# Patient Record
Sex: FEMALE | Race: WHITE | NOT HISPANIC OR LATINO | URBAN - METROPOLITAN AREA
[De-identification: names, ages, dates, MRNs, and addresses within clinical notes are randomized per-mention and may not be internally consistent; named-entity substitution may affect disease eponyms.]

---

## 2019-03-16 ENCOUNTER — INPATIENT (INPATIENT)
Facility: HOSPITAL | Age: 24
LOS: 3 days | Discharge: HOME | DRG: 866 | End: 2019-03-20
Attending: HOSPITALIST | Admitting: HOSPITALIST
Payer: COMMERCIAL

## 2019-03-16 VITALS
RESPIRATION RATE: 16 BRPM | DIASTOLIC BLOOD PRESSURE: 81 MMHG | HEART RATE: 130 BPM | TEMPERATURE: 101 F | OXYGEN SATURATION: 95 % | SYSTOLIC BLOOD PRESSURE: 137 MMHG

## 2019-03-16 LAB
ALBUMIN SERPL ELPH-MCNC: 4.1 G/DL — SIGNIFICANT CHANGE UP (ref 3.3–5.2)
ALP SERPL-CCNC: 311 U/L — HIGH (ref 40–120)
ALT FLD-CCNC: 325 U/L — HIGH
ANION GAP SERPL CALC-SCNC: 15 MMOL/L — SIGNIFICANT CHANGE UP (ref 5–17)
ANISOCYTOSIS BLD QL: SLIGHT — SIGNIFICANT CHANGE UP
APAP SERPL-MCNC: 8.5 UG/ML — LOW (ref 10–26)
APPEARANCE UR: CLEAR — SIGNIFICANT CHANGE UP
APTT BLD: 33.6 SEC — SIGNIFICANT CHANGE UP (ref 27.5–36.3)
AST SERPL-CCNC: 303 U/L — HIGH
BACTERIA # UR AUTO: ABNORMAL
BASOPHILS # BLD AUTO: 0.4 K/UL — HIGH (ref 0–0.2)
BASOPHILS NFR BLD AUTO: 3 % — HIGH (ref 0–2)
BILIRUB SERPL-MCNC: 1.7 MG/DL — SIGNIFICANT CHANGE UP (ref 0.4–2)
BILIRUB UR-MCNC: ABNORMAL
BUN SERPL-MCNC: 10 MG/DL — SIGNIFICANT CHANGE UP (ref 8–20)
CALCIUM SERPL-MCNC: 8.8 MG/DL — SIGNIFICANT CHANGE UP (ref 8.6–10.2)
CHLORIDE SERPL-SCNC: 94 MMOL/L — LOW (ref 98–107)
CO2 SERPL-SCNC: 22 MMOL/L — SIGNIFICANT CHANGE UP (ref 22–29)
COLOR SPEC: YELLOW — SIGNIFICANT CHANGE UP
CREAT SERPL-MCNC: 0.69 MG/DL — SIGNIFICANT CHANGE UP (ref 0.5–1.3)
DIFF PNL FLD: ABNORMAL
EOSINOPHIL # BLD AUTO: 0 K/UL — SIGNIFICANT CHANGE UP (ref 0–0.5)
EOSINOPHIL NFR BLD AUTO: 0 % — SIGNIFICANT CHANGE UP (ref 0–6)
EPI CELLS # UR: ABNORMAL
GLUCOSE SERPL-MCNC: 80 MG/DL — SIGNIFICANT CHANGE UP (ref 70–115)
GLUCOSE UR QL: NEGATIVE MG/DL — SIGNIFICANT CHANGE UP
HCG SERPL-ACNC: <4 MIU/ML — SIGNIFICANT CHANGE UP
HCG UR QL: NEGATIVE — SIGNIFICANT CHANGE UP
HCT VFR BLD CALC: 40.6 % — SIGNIFICANT CHANGE UP (ref 37–47)
HETEROPH AB TITR SER AGGL: POSITIVE
HGB BLD-MCNC: 14.1 G/DL — SIGNIFICANT CHANGE UP (ref 12–16)
HYPOCHROMIA BLD QL: SLIGHT — SIGNIFICANT CHANGE UP
INR BLD: 1.09 RATIO — SIGNIFICANT CHANGE UP (ref 0.88–1.16)
KETONES UR-MCNC: ABNORMAL
LACTATE BLDV-MCNC: 1.1 MMOL/L — SIGNIFICANT CHANGE UP (ref 0.5–2)
LEUKOCYTE ESTERASE UR-ACNC: ABNORMAL
LYMPHOCYTES # BLD AUTO: 3.4 K/UL — SIGNIFICANT CHANGE UP (ref 1–4.8)
LYMPHOCYTES # BLD AUTO: 51 % — SIGNIFICANT CHANGE UP (ref 20–55)
MCHC RBC-ENTMCNC: 31.3 PG — HIGH (ref 27–31)
MCHC RBC-ENTMCNC: 34.7 G/DL — SIGNIFICANT CHANGE UP (ref 32–36)
MCV RBC AUTO: 90 FL — SIGNIFICANT CHANGE UP (ref 81–99)
MONOCYTES # BLD AUTO: 0.8 K/UL — SIGNIFICANT CHANGE UP (ref 0–0.8)
MONOCYTES NFR BLD AUTO: 10 % — SIGNIFICANT CHANGE UP (ref 3–10)
NEUTROPHILS # BLD AUTO: 2 K/UL — SIGNIFICANT CHANGE UP (ref 1.8–8)
NEUTROPHILS NFR BLD AUTO: 32 % — LOW (ref 37–73)
NITRITE UR-MCNC: POSITIVE
PH UR: 6 — SIGNIFICANT CHANGE UP (ref 5–8)
PLAT MORPH BLD: NORMAL — SIGNIFICANT CHANGE UP
PLATELET # BLD AUTO: 112 K/UL — LOW (ref 150–400)
POIKILOCYTOSIS BLD QL AUTO: SLIGHT — SIGNIFICANT CHANGE UP
POTASSIUM SERPL-MCNC: 4 MMOL/L — SIGNIFICANT CHANGE UP (ref 3.5–5.3)
POTASSIUM SERPL-SCNC: 4 MMOL/L — SIGNIFICANT CHANGE UP (ref 3.5–5.3)
PROT SERPL-MCNC: 8 G/DL — SIGNIFICANT CHANGE UP (ref 6.6–8.7)
PROT UR-MCNC: 30 MG/DL
PROTHROM AB SERPL-ACNC: 12.6 SEC — SIGNIFICANT CHANGE UP (ref 10–12.9)
RBC # BLD: 4.51 M/UL — SIGNIFICANT CHANGE UP (ref 4.4–5.2)
RBC # FLD: 12.6 % — SIGNIFICANT CHANGE UP (ref 11–15.6)
RBC BLD AUTO: SIGNIFICANT CHANGE UP
RBC CASTS # UR COMP ASSIST: ABNORMAL /HPF (ref 0–4)
SODIUM SERPL-SCNC: 131 MMOL/L — LOW (ref 135–145)
SP GR SPEC: 1.01 — SIGNIFICANT CHANGE UP (ref 1.01–1.02)
UROBILINOGEN FLD QL: 12 MG/DL
VARIANT LYMPHS # BLD: 4 % — SIGNIFICANT CHANGE UP (ref 0–6)
WBC # BLD: 6.7 K/UL — SIGNIFICANT CHANGE UP (ref 4.8–10.8)
WBC # FLD AUTO: 6.7 K/UL — SIGNIFICANT CHANGE UP (ref 4.8–10.8)
WBC UR QL: SIGNIFICANT CHANGE UP

## 2019-03-16 PROCEDURE — 76856 US EXAM PELVIC COMPLETE: CPT | Mod: 26

## 2019-03-16 PROCEDURE — 99218: CPT

## 2019-03-16 PROCEDURE — 76700 US EXAM ABDOM COMPLETE: CPT | Mod: 26

## 2019-03-16 RX ORDER — SODIUM CHLORIDE 9 MG/ML
2000 INJECTION INTRAMUSCULAR; INTRAVENOUS; SUBCUTANEOUS ONCE
Qty: 0 | Refills: 0 | Status: COMPLETED | OUTPATIENT
Start: 2019-03-16 | End: 2019-03-16

## 2019-03-16 RX ORDER — IBUPROFEN 200 MG
600 TABLET ORAL EVERY 6 HOURS
Qty: 0 | Refills: 0 | Status: DISCONTINUED | OUTPATIENT
Start: 2019-03-16 | End: 2019-03-20

## 2019-03-16 RX ORDER — ONDANSETRON 8 MG/1
4 TABLET, FILM COATED ORAL ONCE
Qty: 0 | Refills: 0 | Status: COMPLETED | OUTPATIENT
Start: 2019-03-16 | End: 2019-03-16

## 2019-03-16 RX ORDER — CEFTRIAXONE 500 MG/1
1 INJECTION, POWDER, FOR SOLUTION INTRAMUSCULAR; INTRAVENOUS EVERY 24 HOURS
Qty: 0 | Refills: 0 | Status: DISCONTINUED | OUTPATIENT
Start: 2019-03-17 | End: 2019-03-17

## 2019-03-16 RX ORDER — ACETAMINOPHEN 500 MG
650 TABLET ORAL ONCE
Qty: 0 | Refills: 0 | Status: COMPLETED | OUTPATIENT
Start: 2019-03-16 | End: 2019-03-16

## 2019-03-16 RX ORDER — AZITHROMYCIN 500 MG/1
1000 TABLET, FILM COATED ORAL ONCE
Qty: 0 | Refills: 0 | Status: COMPLETED | OUTPATIENT
Start: 2019-03-16 | End: 2019-03-16

## 2019-03-16 RX ORDER — CEFTRIAXONE 500 MG/1
1 INJECTION, POWDER, FOR SOLUTION INTRAMUSCULAR; INTRAVENOUS ONCE
Qty: 0 | Refills: 0 | Status: COMPLETED | OUTPATIENT
Start: 2019-03-16 | End: 2019-03-16

## 2019-03-16 RX ORDER — KETOROLAC TROMETHAMINE 30 MG/ML
15 SYRINGE (ML) INJECTION ONCE
Qty: 0 | Refills: 0 | Status: DISCONTINUED | OUTPATIENT
Start: 2019-03-16 | End: 2019-03-16

## 2019-03-16 RX ORDER — SODIUM CHLORIDE 9 MG/ML
1000 INJECTION INTRAMUSCULAR; INTRAVENOUS; SUBCUTANEOUS
Qty: 0 | Refills: 0 | Status: DISCONTINUED | OUTPATIENT
Start: 2019-03-16 | End: 2019-03-19

## 2019-03-16 RX ADMIN — Medication 15 MILLIGRAM(S): at 15:37

## 2019-03-16 RX ADMIN — ONDANSETRON 4 MILLIGRAM(S): 8 TABLET, FILM COATED ORAL at 21:40

## 2019-03-16 RX ADMIN — Medication 650 MILLIGRAM(S): at 15:37

## 2019-03-16 RX ADMIN — CEFTRIAXONE 100 GRAM(S): 500 INJECTION, POWDER, FOR SOLUTION INTRAMUSCULAR; INTRAVENOUS at 14:20

## 2019-03-16 RX ADMIN — SODIUM CHLORIDE 2000 MILLILITER(S): 9 INJECTION INTRAMUSCULAR; INTRAVENOUS; SUBCUTANEOUS at 15:37

## 2019-03-16 RX ADMIN — Medication 30 MILLILITER(S): at 21:40

## 2019-03-16 RX ADMIN — SODIUM CHLORIDE 2000 MILLILITER(S): 9 INJECTION INTRAMUSCULAR; INTRAVENOUS; SUBCUTANEOUS at 14:19

## 2019-03-16 RX ADMIN — SODIUM CHLORIDE 125 MILLILITER(S): 9 INJECTION INTRAMUSCULAR; INTRAVENOUS; SUBCUTANEOUS at 18:41

## 2019-03-16 RX ADMIN — AZITHROMYCIN 1000 MILLIGRAM(S): 500 TABLET, FILM COATED ORAL at 15:36

## 2019-03-16 RX ADMIN — Medication 650 MILLIGRAM(S): at 14:19

## 2019-03-16 RX ADMIN — CEFTRIAXONE 1 GRAM(S): 500 INJECTION, POWDER, FOR SOLUTION INTRAMUSCULAR; INTRAVENOUS at 15:37

## 2019-03-16 NOTE — ED PROVIDER NOTE - OBJECTIVE STATEMENT
24 y/o female presents c/o fever x 6 days associated with left sided flank/back pain and hematuria. PT was evaluated by her PMD and had a negative flu test and was prescribed Zithromax without relief of symptoms. Denies any HA, neck pain, neck stiffness, nasal congestion, sore throat, cough, cp, sob, dysuria, urinary frequency, or vaginal discharge. PT reports the a few days prior to they symptoms started she had unprotected intercourse and she experienced pain with intercourse.  She denies any vaginal discharge. PT reports this is a new sexual partner for her.

## 2019-03-16 NOTE — ED ADULT TRIAGE NOTE - CHIEF COMPLAINT QUOTE
patient complaining of fever, hematuria over the past 6 days without improvement with left sided pain radiating to left abdomen

## 2019-03-16 NOTE — ED CDU PROVIDER INITIAL DAY NOTE - MEDICAL DECISION MAKING DETAILS
24 y/o female with fever and left flank pain admitted to observation for IV abx for likely pyelonephritis

## 2019-03-16 NOTE — ED ADULT NURSE REASSESSMENT NOTE - NS ED NURSE REASSESS COMMENT FT1
assumed care of pt @ 1930, report received from tomas MAYFIELD, charting as noted. Pt AOx4 in NAD, Vital Signs Stable. Pt denies any nausea/vomiting/abdominal pain at this time. Pt educated on plan for next dose of rocephin tm. HR is elevated 101 bpm, lung sounds are clear b/l, abd is soft and nontender with positive bowel sounds in all four quadrants, skin is warm, dry and appropriate for age and race. pt educated on plan of care and observation stay. Plan of care taught back to RN. Proficiency determined from successful pt teach back. Pt oriented to unit, staff, and room. Pt reeducated on call bell use. Bed locked in lowest position, call bell within reach. All questions and concerns addressed.

## 2019-03-16 NOTE — ED CDU PROVIDER INITIAL DAY NOTE - ATTENDING CONTRIBUTION TO CARE
I, Markos Romero, have personally performed a face to face diagnostic evaluation on this patient. I have reviewed the ACP note and agree with the history, exam and plan of care, except as noted.    24 yo F hx of fever x 6 day associated with left flank/ LUQ pain. She was seen at urgent care, had negative CXR and flu but started on Zithromax. Patient denied dysuria but Mom noted dark urine. Patient was febrile and tachycardic. WBC wnl. Lactate wnl. She was found to have UTI, consistent with pyelonephritis. She received ceftriaxone. She report pain with intercourse. Pelvic exam showed erythematous cervices but no cervical motion tenderness. 1G Azithromycin added for epimeric treatment for STD. Blood work showed elevated liver enzyme. Mono spot positive. Patient placed in obs for repeat blood work and IV antibiotic.

## 2019-03-16 NOTE — ED ADULT NURSE REASSESSMENT NOTE - NS ED NURSE REASSESS COMMENT FT1
Pt c/o nausea and 7/10 mid abdomen/epigastric discomfort. Pt states she has acid reflux and discomfort feels similar to that. Pt requesting anti-nausea/acid reflux medications. Call placed to PA, awaiting new orders.

## 2019-03-16 NOTE — ED PROVIDER NOTE - CLINICAL SUMMARY MEDICAL DECISION MAKING FREE TEXT BOX
22 y/o female with fever x one week with left sided flank pain and hematuria  possible pyelo, will require pelvic examination to evaluate for PID, will send labs, hydrate, abx, and re-eval.

## 2019-03-16 NOTE — ED CDU PROVIDER INITIAL DAY NOTE - PROGRESS NOTE DETAILS
Pt is 23y F with no PMH presenting with fever chills L flank pain x 1 week. Pt with UTI in ED. in Observation for antibiotics over night and repeat labs in the morning. Pt states she is feeling a little better. She denies any complaints at this time. PT was shown to have + mono spot. Splenomegaly on US as well as liver upper Pt is 23y F with no PMH presenting with fever chills L flank pain x 1 week. Pt with UTI in ED. in Observation for antibiotics over night and repeat labs in the morning. Pt states she is feeling a little better. She denies any complaints at this time. PT was shown to have + mono spot. Splenomegaly on US as well upper limit of normal liver. Pt resting comfortably in no acute distress. Pt told this is infectious.

## 2019-03-16 NOTE — ED ADULT NURSE REASSESSMENT NOTE - NS ED NURSE REASSESS COMMENT FT1
Assumed care of the patient at 1700. Patient moved to observation unit CDU 10. Patient A&Ox3. Mom at the bedside. No s/s of distress. Denies SOB or CP. VSS, afebrile at this time. 2nd liter of NS bolus continues infusing. Abdomen soft, nondistended. C/o mild nausea, denies any medications for nausea at this time. Denies any abdominal pain at this time. No problem with voiding. Patient updated on plan of care. Aware will be staying in observation unit. Scheduled for tomorrow to receive Rocephin IV. All questions and concerns addressed. Will continue to monitor. Assumed care of the patient at 1700. Patient moved to observation unit CDU 10. Patient A&Ox3. Mom at the bedside. No s/s of distress. Denies SOB or CP. VSS, afebrile at this time. 2nd liter of NS bolus continues infusing. Abdomen soft, nondistended. C/o mild nausea, denies any medications for nausea at this time. Denies sore throat. Denies any abdominal pain at this time. No problem with voiding. Patient updated on plan of care. Aware will be staying in observation unit. Scheduled for tomorrow to receive Rocephin IV. All questions and concerns addressed. Will continue to monitor. Assumed care of the patient at 1700. Patient moved to observation unit CDU 10. Patient A&Ox3. Mom at the bedside. No s/s of distress. Denies SOB or CP. VSS, afebrile at this time. 2nd liter of NS bolus continues infusing. Abdomen soft, nondistended. C/o mild nausea, denies any medications for nausea at this time. Denies sore throat. Denies any abdominal pain at this time. No problem with voiding however per patient urine appears "orange" with some blood and foul smell per patient. Patient updated on plan of care. Aware will be staying in observation unit. Scheduled for tomorrow to receive Rocephin IV. All questions and concerns addressed. Will continue to monitor.

## 2019-03-16 NOTE — ED PROVIDER NOTE - ATTENDING CONTRIBUTION TO CARE
I, Markos Romero, have personally performed a face to face diagnostic evaluation on this patient. I have reviewed the ACP note and agree with the history, exam and plan of care, except as noted.    22 yo F hx of fever x 6 day associated with left flank/ LUQ pain. She was seen at urgent care, had negative CXR and flu but started on Zithromax. Patient denied dysuria but Mom noted dark urine. Patient was febrile and tachycardic. WBC wnl. Lactate wnl. She was found to have UTI, consistent with pyleonephritis. She received ceftriaxone. She report pain with intercourse. Pelvic exam showed erythematus cervics but no cevical motion tenderness. 1G Azithromycine added for emperic treatment for STD. Blood work showed elevated liver enzyme. However patient denies RUQ pain. She took tylenol for the past few days with max dose of 4 pills of 500mg dose per day. No recent travel. Unknown eitology of transaminase. Hepatitis level and EBV level pending. Will place the patient in observsation unit for repeat IV abx, pending repeat blood work in the AM and abdominal ultrasound.

## 2019-03-16 NOTE — ED PROVIDER NOTE - PROGRESS NOTE DETAILS
exam ( chaperone FILIPE Murillo)  external: no lesions speculum: + scant discharge, + erythema and irritation to cervix  bi-manul: no adnexal tenderness, no CMT

## 2019-03-17 DIAGNOSIS — D64.9 ANEMIA, UNSPECIFIED: ICD-10-CM

## 2019-03-17 LAB
ALBUMIN SERPL ELPH-MCNC: 3 G/DL — LOW (ref 3.3–5.2)
ALBUMIN SERPL ELPH-MCNC: 3.2 G/DL — LOW (ref 3.3–5.2)
ALBUMIN SERPL ELPH-MCNC: 3.5 G/DL — SIGNIFICANT CHANGE UP (ref 3.3–5.2)
ALP SERPL-CCNC: 260 U/L — HIGH (ref 40–120)
ALP SERPL-CCNC: 283 U/L — HIGH (ref 40–120)
ALP SERPL-CCNC: 348 U/L — HIGH (ref 40–120)
ALT FLD-CCNC: 254 U/L — HIGH
ALT FLD-CCNC: 283 U/L — HIGH
ALT FLD-CCNC: 303 U/L — HIGH
ANION GAP SERPL CALC-SCNC: 12 MMOL/L — SIGNIFICANT CHANGE UP (ref 5–17)
ANION GAP SERPL CALC-SCNC: 13 MMOL/L — SIGNIFICANT CHANGE UP (ref 5–17)
ANION GAP SERPL CALC-SCNC: 16 MMOL/L — SIGNIFICANT CHANGE UP (ref 5–17)
ANISOCYTOSIS BLD QL: SLIGHT — SIGNIFICANT CHANGE UP
AST SERPL-CCNC: 228 U/L — HIGH
AST SERPL-CCNC: 248 U/L — HIGH
AST SERPL-CCNC: 268 U/L — HIGH
BILIRUB SERPL-MCNC: 1 MG/DL — SIGNIFICANT CHANGE UP (ref 0.4–2)
BILIRUB SERPL-MCNC: 1 MG/DL — SIGNIFICANT CHANGE UP (ref 0.4–2)
BILIRUB SERPL-MCNC: 1.3 MG/DL — SIGNIFICANT CHANGE UP (ref 0.4–2)
BUN SERPL-MCNC: 7 MG/DL — LOW (ref 8–20)
BUN SERPL-MCNC: 7 MG/DL — LOW (ref 8–20)
BUN SERPL-MCNC: 8 MG/DL — SIGNIFICANT CHANGE UP (ref 8–20)
BURR CELLS BLD QL SMEAR: PRESENT — SIGNIFICANT CHANGE UP
CALCIUM SERPL-MCNC: 7.8 MG/DL — LOW (ref 8.6–10.2)
CALCIUM SERPL-MCNC: 8.3 MG/DL — LOW (ref 8.6–10.2)
CALCIUM SERPL-MCNC: 8.3 MG/DL — LOW (ref 8.6–10.2)
CHLORIDE SERPL-SCNC: 104 MMOL/L — SIGNIFICANT CHANGE UP (ref 98–107)
CHLORIDE SERPL-SCNC: 105 MMOL/L — SIGNIFICANT CHANGE UP (ref 98–107)
CHLORIDE SERPL-SCNC: 99 MMOL/L — SIGNIFICANT CHANGE UP (ref 98–107)
CO2 SERPL-SCNC: 17 MMOL/L — LOW (ref 22–29)
CO2 SERPL-SCNC: 19 MMOL/L — LOW (ref 22–29)
CO2 SERPL-SCNC: 21 MMOL/L — LOW (ref 22–29)
CREAT SERPL-MCNC: 0.53 MG/DL — SIGNIFICANT CHANGE UP (ref 0.5–1.3)
CREAT SERPL-MCNC: 0.53 MG/DL — SIGNIFICANT CHANGE UP (ref 0.5–1.3)
CREAT SERPL-MCNC: 0.6 MG/DL — SIGNIFICANT CHANGE UP (ref 0.5–1.3)
CULTURE RESULTS: NO GROWTH — SIGNIFICANT CHANGE UP
EOSINOPHIL NFR BLD AUTO: 3 % — SIGNIFICANT CHANGE UP (ref 0–5)
GLUCOSE SERPL-MCNC: 75 MG/DL — SIGNIFICANT CHANGE UP (ref 70–115)
GLUCOSE SERPL-MCNC: 84 MG/DL — SIGNIFICANT CHANGE UP (ref 70–115)
GLUCOSE SERPL-MCNC: 99 MG/DL — SIGNIFICANT CHANGE UP (ref 70–115)
HAV IGM SER-ACNC: SIGNIFICANT CHANGE UP
HBV CORE IGM SER-ACNC: SIGNIFICANT CHANGE UP
HBV SURFACE AG SER-ACNC: SIGNIFICANT CHANGE UP
HCT VFR BLD CALC: 31.2 % — LOW (ref 37–47)
HCT VFR BLD CALC: 32.9 % — LOW (ref 37–47)
HCT VFR BLD CALC: 34.4 % — LOW (ref 37–47)
HCV AB S/CO SERPL IA: 0.13 S/CO — SIGNIFICANT CHANGE UP (ref 0–0.79)
HCV AB SERPL-IMP: SIGNIFICANT CHANGE UP
HGB BLD-MCNC: 10.5 G/DL — LOW (ref 12–16)
HGB BLD-MCNC: 11.3 G/DL — LOW (ref 12–16)
HGB BLD-MCNC: 11.7 G/DL — LOW (ref 12–16)
HYPOCHROMIA BLD QL: SLIGHT — SIGNIFICANT CHANGE UP
HYPOCHROMIA BLD QL: SLIGHT — SIGNIFICANT CHANGE UP
LDH SERPL L TO P-CCNC: 499 U/L — HIGH (ref 98–192)
LDH SERPL L TO P-CCNC: 516 U/L — HIGH (ref 98–192)
LYMPHOCYTES # BLD AUTO: 14 % — LOW (ref 20–55)
LYMPHOCYTES # BLD AUTO: 20 % — SIGNIFICANT CHANGE UP (ref 20–55)
LYMPHOCYTES # BLD AUTO: 60 % — HIGH (ref 20–55)
MACROCYTES BLD QL: SLIGHT — SIGNIFICANT CHANGE UP
MACROCYTES BLD QL: SLIGHT — SIGNIFICANT CHANGE UP
MCHC RBC-ENTMCNC: 30.4 PG — SIGNIFICANT CHANGE UP (ref 27–31)
MCHC RBC-ENTMCNC: 30.6 PG — SIGNIFICANT CHANGE UP (ref 27–31)
MCHC RBC-ENTMCNC: 31 PG — SIGNIFICANT CHANGE UP (ref 27–31)
MCHC RBC-ENTMCNC: 33.7 G/DL — SIGNIFICANT CHANGE UP (ref 32–36)
MCHC RBC-ENTMCNC: 34 G/DL — SIGNIFICANT CHANGE UP (ref 32–36)
MCHC RBC-ENTMCNC: 34.3 G/DL — SIGNIFICANT CHANGE UP (ref 32–36)
MCV RBC AUTO: 90.1 FL — SIGNIFICANT CHANGE UP (ref 81–99)
MCV RBC AUTO: 90.4 FL — SIGNIFICANT CHANGE UP (ref 81–99)
MCV RBC AUTO: 90.4 FL — SIGNIFICANT CHANGE UP (ref 81–99)
METAMYELOCYTES # FLD: 1 % — HIGH (ref 0–0)
MICROCYTES BLD QL: SLIGHT — SIGNIFICANT CHANGE UP
MONOCYTES NFR BLD AUTO: 11 % — HIGH (ref 3–10)
MONOCYTES NFR BLD AUTO: 6 % — SIGNIFICANT CHANGE UP (ref 3–10)
MONOCYTES NFR BLD AUTO: 8 % — SIGNIFICANT CHANGE UP (ref 3–10)
MYELOCYTES NFR BLD: 1 % — HIGH (ref 0–0)
MYELOCYTES NFR BLD: 1 % — HIGH (ref 0–0)
NEUTROPHILS NFR BLD AUTO: 32 % — LOW (ref 37–73)
NEUTROPHILS NFR BLD AUTO: 47 % — SIGNIFICANT CHANGE UP (ref 37–73)
NEUTROPHILS NFR BLD AUTO: 50 % — SIGNIFICANT CHANGE UP (ref 37–73)
NEUTS BAND # BLD: 1 % — SIGNIFICANT CHANGE UP (ref 0–8)
OVALOCYTES BLD QL SMEAR: SLIGHT — SIGNIFICANT CHANGE UP
PLAT MORPH BLD: NORMAL — SIGNIFICANT CHANGE UP
PLATELET # BLD AUTO: 101 K/UL — LOW (ref 150–400)
PLATELET # BLD AUTO: 104 K/UL — LOW (ref 150–400)
PLATELET # BLD AUTO: 96 K/UL — LOW (ref 150–400)
POIKILOCYTOSIS BLD QL AUTO: SLIGHT — SIGNIFICANT CHANGE UP
POTASSIUM SERPL-MCNC: 3.7 MMOL/L — SIGNIFICANT CHANGE UP (ref 3.5–5.3)
POTASSIUM SERPL-MCNC: 3.8 MMOL/L — SIGNIFICANT CHANGE UP (ref 3.5–5.3)
POTASSIUM SERPL-MCNC: 4 MMOL/L — SIGNIFICANT CHANGE UP (ref 3.5–5.3)
POTASSIUM SERPL-SCNC: 3.7 MMOL/L — SIGNIFICANT CHANGE UP (ref 3.5–5.3)
POTASSIUM SERPL-SCNC: 3.8 MMOL/L — SIGNIFICANT CHANGE UP (ref 3.5–5.3)
POTASSIUM SERPL-SCNC: 4 MMOL/L — SIGNIFICANT CHANGE UP (ref 3.5–5.3)
PROT SERPL-MCNC: 5.8 G/DL — LOW (ref 6.6–8.7)
PROT SERPL-MCNC: 6.1 G/DL — LOW (ref 6.6–8.7)
PROT SERPL-MCNC: 6.5 G/DL — LOW (ref 6.6–8.7)
RBC # BLD: 3.45 M/UL — LOW (ref 4.4–5.2)
RBC # BLD: 3.64 M/UL — LOW (ref 4.4–5.2)
RBC # BLD: 3.8 M/UL — LOW (ref 4.4–5.2)
RBC # BLD: 3.82 M/UL — LOW (ref 4.4–5.2)
RBC # FLD: 13 % — SIGNIFICANT CHANGE UP (ref 11–15.6)
RBC BLD AUTO: ABNORMAL
RETICS #: 1.2 K/UL — LOW (ref 25–125)
RETICS #: 1.4 K/UL — LOW (ref 25–125)
RETICS/RBC NFR: 0.3 % — LOW (ref 0.5–2.5)
RETICS/RBC NFR: 0.4 % — LOW (ref 0.5–2.5)
SODIUM SERPL-SCNC: 132 MMOL/L — LOW (ref 135–145)
SODIUM SERPL-SCNC: 136 MMOL/L — SIGNIFICANT CHANGE UP (ref 135–145)
SODIUM SERPL-SCNC: 138 MMOL/L — SIGNIFICANT CHANGE UP (ref 135–145)
SPECIMEN SOURCE: SIGNIFICANT CHANGE UP
VARIANT LYMPHS # BLD: 22 % — HIGH (ref 0–6)
VARIANT LYMPHS # BLD: 23 % — HIGH (ref 0–6)
WBC # BLD: 7.1 K/UL — SIGNIFICANT CHANGE UP (ref 4.8–10.8)
WBC # BLD: 7.8 K/UL — SIGNIFICANT CHANGE UP (ref 4.8–10.8)
WBC # BLD: 8 K/UL — SIGNIFICANT CHANGE UP (ref 4.8–10.8)
WBC # FLD AUTO: 7.1 K/UL — SIGNIFICANT CHANGE UP (ref 4.8–10.8)
WBC # FLD AUTO: 7.8 K/UL — SIGNIFICANT CHANGE UP (ref 4.8–10.8)
WBC # FLD AUTO: 8 K/UL — SIGNIFICANT CHANGE UP (ref 4.8–10.8)

## 2019-03-17 PROCEDURE — 99223 1ST HOSP IP/OBS HIGH 75: CPT

## 2019-03-17 PROCEDURE — 74177 CT ABD & PELVIS W/CONTRAST: CPT | Mod: 26

## 2019-03-17 PROCEDURE — 99217: CPT

## 2019-03-17 RX ORDER — EPINEPHRINE 0.3 MG/.3ML
0 INJECTION INTRAMUSCULAR; SUBCUTANEOUS
Qty: 0 | Refills: 0 | COMMUNITY

## 2019-03-17 RX ORDER — ACETAMINOPHEN 500 MG
650 TABLET ORAL EVERY 6 HOURS
Qty: 0 | Refills: 0 | Status: DISCONTINUED | OUTPATIENT
Start: 2019-03-17 | End: 2019-03-20

## 2019-03-17 RX ORDER — IRON,CARBONYL 45 MG
0 TABLET ORAL
Qty: 0 | Refills: 0 | COMMUNITY

## 2019-03-17 RX ORDER — ONDANSETRON 8 MG/1
4 TABLET, FILM COATED ORAL EVERY 6 HOURS
Qty: 0 | Refills: 0 | Status: DISCONTINUED | OUTPATIENT
Start: 2019-03-17 | End: 2019-03-20

## 2019-03-17 RX ORDER — IBUPROFEN 200 MG
600 TABLET ORAL ONCE
Qty: 0 | Refills: 0 | Status: COMPLETED | OUTPATIENT
Start: 2019-03-17 | End: 2019-03-17

## 2019-03-17 RX ORDER — CEPHALEXIN 500 MG
500 CAPSULE ORAL ONCE
Qty: 0 | Refills: 0 | Status: COMPLETED | OUTPATIENT
Start: 2019-03-17 | End: 2019-03-17

## 2019-03-17 RX ADMIN — SODIUM CHLORIDE 125 MILLILITER(S): 9 INJECTION INTRAMUSCULAR; INTRAVENOUS; SUBCUTANEOUS at 00:24

## 2019-03-17 RX ADMIN — Medication 600 MILLIGRAM(S): at 15:47

## 2019-03-17 RX ADMIN — Medication 600 MILLIGRAM(S): at 00:53

## 2019-03-17 RX ADMIN — Medication 600 MILLIGRAM(S): at 00:21

## 2019-03-17 RX ADMIN — Medication 600 MILLIGRAM(S): at 23:56

## 2019-03-17 RX ADMIN — Medication 500 MILLIGRAM(S): at 13:58

## 2019-03-17 RX ADMIN — SODIUM CHLORIDE 1000 MILLILITER(S): 9 INJECTION INTRAMUSCULAR; INTRAVENOUS; SUBCUTANEOUS at 11:14

## 2019-03-17 RX ADMIN — Medication 600 MILLIGRAM(S): at 02:00

## 2019-03-17 RX ADMIN — Medication 600 MILLIGRAM(S): at 13:58

## 2019-03-17 NOTE — CONSULT NOTE ADULT - SUBJECTIVE AND OBJECTIVE BOX
23 yr old female w no PMHx presented to [facility] ED  w fever, L flank pain and hematuria.         Pt reported the onset of fever 1 week ago, 03-10  Continued to occur over past week.  On  developed L sided flank pain.  Described as intermittent.  No radiation.  Did not grade it and does not know what makes it better or what makes it worse  +T max 103.5  + associated myalgias  + nausea  +decreased appetite  +fatigue  No travel hx or known sick contacts       In ED had blood and urine cx done.  Given IV 2 L w maintenance fluids.  Mild cervicitis seen on exam so GC/Chlamydia cx taken. Treated empirically w ceftriaxone/zithromax.   Pt given ceftriaxone +zithromax.  US done showed splenomegaly.  CT scan followed showing hepatosplenomegaly HSM.  [place] test +   LFT abn  Hb 14 dropped to 11.7 w thrombocytopenia.  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  Epistaxis      PAST SURGICAL HX  several nasal cauteries    FAMILY HX  Father used to have nosebleeds but "grew out of them"  Family member on paternal line has autoimmune disease; dx and exact relationship unknown    SOCIAL HX  nonsmoker  no alcohol  no drugs    ROS:  GEN + T max 103.5 + fatigue + decreased appetite  HEENT denied sore throat, no recent infection, no recent nasal congestion  RESP no SOB  CARD no chest pain  GI + N, + LUQ/L flank abd pain   +L flank pain, dark urine  MSK + myalgia  DERM no rash, + hx bruising easily w/o recent change    T(C): 37.1 (19 @ 15:16), Max: 39.4 (19 @ 13:43)  HR: 102 (19 @ 15:16) (85 - 113)  BP: 115/74 (19 @ 15:16) (101/58 - 119/77)  RR: 19 (19 @ 15:16) (18 - 19)  SpO2: 96% (19 @ 15:16) (96% - 100%)    PHYSICAL EXAM: evaluation precludes physical exam. Pertinent physical exam findings as per video conference with zechariahNA at bedside is as follows  appears comfortable during interview                         11.7   8.0   )-----------( 104      ( 17 Mar 2019 13:40 )   PRIOR Hb 14 03-16             34.4     17 Mar 2019 13:40    132    |  99     |  7.0    ----------------------------<  99     4.0     |  21.0   |  0.60     Ca    8.3        17 Mar 2019 13:40    TPro  6.5    /  Alb  3.5    /  TBili  1.3    /  DBili  x      /  AST  268    /  ALT  303    /  AlkPhos  348    17 Mar 2019 13:40    PT/INR - ( 16 Mar 2019 14:12 )   PT: 12.6 sec;   INR: 1.09 ratio         PTT - ( 16 Mar 2019 14:12 )  PTT:33.6 sec  CAPILLARY BLOOD GLUCOSE    LIVER FUNCTIONS - ( 17 Mar 2019 13:40 )  Alb: 3.5 g/dL / Pro: 6.5 g/dL / ALK PHOS: 348 U/L / ALT: 303 U/L / AST: 268 U/L / GGT: x             Urinalysis Basic - ( 16 Mar 2019 14:22 )    Color: Yellow / Appearance: Clear / S.015 / pH: x  Gluc: x / Ketone: Large  / Bili: Moderate / Urobili: 12 mg/dL   Blood: x / Protein: 30 mg/dL / Nitrite: Positive   Leuk Esterase: Moderate / RBC: 3-5 /HPF / WBC 3-5   Sq Epi: x / Non Sq Epi: Moderate / Bacteria: Moderate      RADIOLOGY  1) EXAM:  CT ABDOMEN AND PELVIS IC                        PROCEDURE DATE:  2019      INTERPRETATION:  Clinical Information: Left flank pain. History of   splenomegaly.    Comparison: None    Technique: Contrast enhanced  CT abdomen and pelvis with IV and oral   contrast. 100 cc of Omnipaque 350 utilized.  Axial, sagittal, coronal   reconstructions.     Findings:    Imaged chest: Unremarkable.  Hepatobiliary: Hepatomegaly, 21 cm . Gallbladder is collapsed with   moderate wall edema, nonspecific.  Spleen: Splenomegaly, 17 cm.  Pancreas: Unremarkable.  Adrenal glands: Unremarkable.  Urinary: Kidneys, ureters, urinary bladder are unremarkable.   Reproductive organs: Uterus and adnexa are unremarkable.    Gastrointestinal: The stomach, small bowel, large bowel, and appendix are   unremarkable.    Peritoneum: Unremarkable.  Extraperitoneum: Small free fluid in the pelvis.  Vasculature: Unremarkable.  Retroperitoneum: Unremarkable.  Subcutaneous tissues: Unremarkable.  Neuromusculoskeletal: Unremarkable.    Impression:   -Significant hepatosplenomegaly, unknown etiology.        2)  EXAM:  US ABDOMEN COMPLETE                        PROCEDURE DATE:  2019      INTERPRETATION:  CLINICAL INFORMATION: Elevated liver function test.    Left flank pain.    COMPARISON: None available.  TECHNIQUE: Sonography of the abdomen.     FINDINGS:  Liver: 17.8 cm, upper limits of normal in size.  Normal echogenicity.  No   focal lesion is visualized.  Bile ducts: Normal caliber. Common bile duct measures 1.5 mm.   Gallbladder: Within normal limits.      Pancreas: Visualized portions are within normal limits.  Spleen: 14.8 x 5.9 x 5.6 cm.  No focal lesion is visualized by   sonographic technique.  Right kidney: 11.7 cm No hydronephrosis.      Left kidney: 11.7 cm No hydronephrosis.      Ascites: None.  Aorta and IVC: Visualized portions are within normal limits.  IMPRESSION:     Splenomegaly.  Liver is upper limits of is normal in size. 23 yr old female w no PMHx presented to [facility] ED  w fever, L flank pain and hematuria.         Pt reported the onset of fever 1 week ago, 03-10  Continued to occur over past week.  On  developed L sided flank pain.  Described as intermittent.  No radiation.  Did not grade it and does not know what makes it better or what makes it worse  +T max 103.5  + associated myalgias  + nausea  +decreased appetite  +fatigue  +unprotected sex w new partner  No travel hx or known sick contacts       In ED had blood and urine cx done.  Given IV 2 L w maintenance fluids.  Mild cervicitis seen on exam so GC/Chlamydia cx taken. Treated empirically w ceftriaxone/zithromax.   Pt given ceftriaxone +zithromax.  US done showed splenomegaly.  CT scan followed showing hepatosplenomegaly HSM.  [place] test +   LFT abn  Hb 14 dropped to 11.7 w thrombocytopenia.  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  Epistaxis      PAST SURGICAL HX  several nasal cauteries    FAMILY HX  Father used to have nosebleeds but "grew out of them"  Family member on paternal line has autoimmune disease; dx and exact relationship unknown    SOCIAL HX  nonsmoker  no alcohol  no drugs    ROS:  GEN + T max 103.5 + fatigue + decreased appetite  HEENT denied sore throat, no recent infection, no recent nasal congestion  RESP no SOB  CARD no chest pain  GI + N, + LUQ/L flank abd pain   +L flank pain, dark urine  MSK + myalgia  DERM no rash, + hx bruising easily w/o recent change    MEDS  Epi-Pen  Multivitamin 1 a day  [place] iron gummies 1 a day  BCP name/dose unknown    ALLERGIES  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 Pertinent physical exam findings as per video conference with teleNA at bedside is as follows  appears comfortable during interview                         11.7   8.0   )-----------( 104      ( 17 Mar 2019 13:40 )   PRIOR Hb 14 03-16             34.4     17 Mar 2019 13:40    132    |  99     |  7.0    ----------------------------<  99     4.0     |  21.0   |  0.60     Ca    8.3        17 Mar 2019 13:40    TPro  6.5    /  Alb  3.5    /  TBili  1.3    /  DBili  x      /  AST  268    /  ALT  303    /  AlkPhos  348    17 Mar 2019 13:40    PT/INR - ( 16 Mar 2019 14:12 )   PT: 12.6 sec;   INR: 1.09 ratio         PTT - ( 16 Mar 2019 14:12 )  PTT:33.6 sec  CAPILLARY BLOOD GLUCOSE    LIVER FUNCTIONS - ( 17 Mar 2019 13:40 )  Alb: 3.5 g/dL / Pro: 6.5 g/dL / ALK PHOS: 348 U/L / ALT: 303 U/L / AST: 268 U/L / GGT: x             Urinalysis Basic - ( 16 Mar 2019 14:22 )    Color: Yellow / Appearance: Clear / S.015 / pH: x  Gluc: x / Ketone: Large  / Bili: Moderate / Urobili: 12 mg/dL   Blood: x / Protein: 30 mg/dL / Nitrite: Positive   Leuk Esterase: Moderate / RBC: 3-5 /HPF / WBC 3-5   Sq Epi: x / Non Sq Epi: Moderate / Bacteria: Moderate      RADIOLOGY  1) EXAM:  CT ABDOMEN AND PELVIS IC                        PROCEDURE DATE:  2019      INTERPRETATION:  Clinical Information: Left flank pain. History of   splenomegaly.    Comparison: None    Technique: Contrast enhanced  CT abdomen and pelvis with IV and oral   contrast. 100 cc of Omnipaque 350 utilized.  Axial, sagittal, coronal   reconstructions.     Findings:    Imaged chest: Unremarkable.  Hepatobiliary: Hepatomegaly, 21 cm . Gallbladder is collapsed with   moderate wall edema, nonspecific.  Spleen: Splenomegaly, 17 cm.  Pancreas: Unremarkable.  Adrenal glands: Unremarkable.  Urinary: Kidneys, ureters, urinary bladder are unremarkable.   Reproductive organs: Uterus and adnexa are unremarkable.    Gastrointestinal: The stomach, small bowel, large bowel, and appendix are   unremarkable.    Peritoneum: Unremarkable.  Extraperitoneum: Small free fluid in the pelvis.  Vasculature: Unremarkable.  Retroperitoneum: Unremarkable.  Subcutaneous tissues: Unremarkable.  Neuromusculoskeletal: Unremarkable.    Impression:   -Significant hepatosplenomegaly, unknown etiology.        2)  EXAM:  US ABDOMEN COMPLETE                        PROCEDURE DATE:  2019      INTERPRETATION:  CLINICAL INFORMATION: Elevated liver function test.    Left flank pain.    COMPARISON: None available.  TECHNIQUE: Sonography of the abdomen.     FINDINGS:  Liver: 17.8 cm, upper limits of normal in size.  Normal echogenicity.  No   focal lesion is visualized.  Bile ducts: Normal caliber. Common bile duct measures 1.5 mm.   Gallbladder: Within normal limits.      Pancreas: Visualized portions are within normal limits.  Spleen: 14.8 x 5.9 x 5.6 cm.  No focal lesion is visualized by   sonographic technique.  Right kidney: 11.7 cm No hydronephrosis.      Left kidney: 11.7 cm No hydronephrosis.      Ascites: None.  Aorta and IVC: Visualized portions are within normal limits.  IMPRESSION:     Splenomegaly.  Liver is upper limits of is normal in size.

## 2019-03-17 NOTE — H&P ADULT - HISTORY OF PRESENT ILLNESS
24 y/o F with Hx Personal and family Hx of Epistaxis, as per her she has been having intermittent fever for last week with L flank pain, initially she started to have fever then after few days flank pain developed her pain is dull and intermittent, yesterday her fever got to 103.5F, hr fever is associated with myalgias, some nausea and fatigue, as per her she had unprotected sex w new partner, after sex she has some vaginal bleeding, she denies any discharge or any more bleeding now, her vaginal pain lasted for few days and then resolved, she has No travel hx or known sick contacts, she denies dysuria, cough, diarrhea, sore throat or swollen glands or joints, dysphagia or odynophagia, she has noted some blood in urine; otherwise, denies bleeding.    In ED had blood and urine cx done, she was  given IV 2 L w maintenance fluids,  Mild cervicitis seen on exam so GC/Chlamydia cx taken, Treated empirically w ceftriaxone/zithromax,  US done showed splenomegaly.  CT scan followed showing hepatosplenomegaly HSM.  [place] test +   LFT abn, Hb 14 dropped to 11.7 w thrombocytopenia.  Had acetaminophen level neg, pos for Mono using agg test  Blood work shows elevated LDH; retic is not elevated; LFT's are elevated;  radiologic studies show HSM; Peripheral smear shows atypical lymphs, myelocytes, being admitted under medicine for further evaluation of Anemia, thrombocytopenia, organomegaly, and fever.

## 2019-03-17 NOTE — ED CDU PROVIDER SUBSEQUENT DAY NOTE - HISTORY
22 y/o female presents c/o fever x 6 days associated with left sided flank/back pain and hematuria. PT was evaluated by her PMD and had a negative flu test and was prescribed Zithromax without relief of symptoms. Denies any HA, neck pain, neck stiffness, nasal congestion, sore throat, cough, cp, sob, dysuria, urinary frequency, or vaginal discharge. PT reports the a few days prior to they symptoms started she had unprotected intercourse and she experienced pain with intercourse.  She denies any vaginal discharge. PT reports this is a new sexual partner for her.

## 2019-03-17 NOTE — ED ADULT NURSE REASSESSMENT NOTE - COMFORT CARE
meal provided/ambulated to bathroom/plan of care explained
plan of care explained/side rails down/meal provided

## 2019-03-17 NOTE — ED ADULT NURSE REASSESSMENT NOTE - NS ED NURSE REASSESS COMMENT FT1
Pt reports relief from pain at this time. temp 100.7, motrin administered. Pt resting in stretcher in NAD.

## 2019-03-17 NOTE — CONSULT NOTE ADULT - SUBJECTIVE AND OBJECTIVE BOX
HPI: Patient is a 23y Female seen on consultation for the evaluation and management of anemia and thrombocytopenia.  She has no known PMHx and was in her USOH until last week when she developed fevers to 102 degrees, associated with chills, nausea, one episode of vomiting.  Denied localizing symptoms; denies dysuria, cough, diarrhea.  Developed LUQ pain 2 days prior.  Still notes low grade fevers, poor po intake over this week.  Denies sore throat or swollen glands, denies dysphagia or odynophagia.  has noted blood in urine; otherwise, denies bleeding.  has occasional epistaxis.  Tests pos for Mono using agg test.  Blood work shows elevated LDH; retic is not elevated; LFT's are elevated;  radiologic studies show HSM; Peripheral smear shows atypical lymphs, myelocytes, metamyelocytes.  Had headaches with fever; denies dizziness; denies bone or joint pain.      PAST MEDICAL & SURGICAL HISTORY:  No pertinent past medical history  No significant past surgical history      REVIEW OF SYSTEMS      General:fevers	    Skin/Breast:no rash or lesion  	  Ophthalmologic:no blurry vision or diplopia  	  ENMT:	no erythema, pus or swelling    Respiratory and Thorax:clear,no SOB or wheezing  	  Cardiovascular:	no chest pain or palpitations    Gastrointestinal:	notes LUQ pain  Genitourinary:	notes red colored urine    Musculoskeletal:	no bone or joint pain    Neurological:	no seizure or tremor      Hematology/Lymphatics:	no c/o LN        MEDICATIONS  (STANDING):  sodium chloride 0.9%. 1000 milliLiter(s) (125 mL/Hr) IV Continuous <Continuous>    MEDICATIONS  (PRN):  acetaminophen   Tablet .. 650 milliGRAM(s) Oral every 6 hours PRN Temp greater or equal to 38C (100.4F), Mild Pain (1 - 3)  aluminum hydroxide/magnesium hydroxide/simethicone Suspension 30 milliLiter(s) Oral every 4 hours PRN Dyspepsia  ibuprofen  Tablet. 600 milliGRAM(s) Oral every 6 hours PRN Temp greater or equal to 38C (100.4F), Moderate Pain (4 - 6)  ondansetron Injectable 4 milliGRAM(s) IV Push every 6 hours PRN Nausea      Allergies    No Known Drug Allergies  Nuts (Anaphylaxis)    Intolerances        SOCIAL HISTORY:    Smoking Status:denied  Alcohol:denied    Occupation:Nursing student    FAMILY HISTORY:no FHx of maligancy in first degree relatives            	            Vital Signs Last 24 Hrs  T(C): 37.1 (17 Mar 2019 15:16), Max: 39.4 (17 Mar 2019 13:43)  T(F): 98.8 (17 Mar 2019 15:16), Max: 102.9 (17 Mar 2019 13:43)  HR: 102 (17 Mar 2019 15:16) (85 - 113)  BP: 115/74 (17 Mar 2019 15:16) (101/58 - 119/77)  BP(mean): --  RR: 19 (17 Mar 2019 15:16) (18 - 19)  SpO2: 96% (17 Mar 2019 15:16) (96% - 100%)    PHYSICAL EXAM:      Constitutional:WDWN healthy appearing, awake, alert, NAD    Eyes:anicteric    ENMT:no lesion, no erythema, no evidence of infection    Neck:no adenopathy      Respiratory:Clear, without wheezing or rhonchi    Cardiovascular:RRR normal S1S2    Gastrointestinal:soft, palpable spleen tip          Extremities:no c/C/Edema            Skin:no rash    Lymph Nodes:no adenoptahy                LABS:                        11.7   8.0   )-----------( 104      ( 17 Mar 2019 13:40 )             34.4         132<L>  |  99  |  7.0<L>  ----------------------------<  99  4.0   |  21.0<L>  |  0.60    Ca    8.3<L>      17 Mar 2019 13:40    TPro  6.5<L>  /  Alb  3.5  /  TBili  1.3  /  DBili  x   /  AST  268<H>  /  ALT  303<H>  /  AlkPhos  348<H>  17    PT/INR - ( 16 Mar 2019 14:12 )   PT: 12.6 sec;   INR: 1.09 ratio         PTT - ( 16 Mar 2019 14:12 )  PTT:33.6 sec  Urinalysis Basic - ( 16 Mar 2019 14:22 )    Color: Yellow / Appearance: Clear / S.015 / pH: x  Gluc: x / Ketone: Large  / Bili: Moderate / Urobili: 12 mg/dL   Blood: x / Protein: 30 mg/dL / Nitrite: Positive   Leuk Esterase: Moderate / RBC: 3-5 /HPF / WBC 3-5   Sq Epi: x / Non Sq Epi: Moderate / Bacteria: Moderate        RADIOLOGY & ADDITIONAL STUDIES:

## 2019-03-17 NOTE — H&P ADULT - NSHPREVIEWOFSYSTEMS_GEN_ALL_CORE
CONSTITUTIONAL: has fever, some fatigue  RESPIRATORY: No cough, No shortness of breath  CARDIOVASCULAR: No chest pain, palpitations  GASTROINTESTINAL: No abdominal, some nausea, no vomiting, flank pain  NEUROLOGICAL: No headaches,  loss of strength.  MISCELLANEOUS: No joint swelling or pain

## 2019-03-17 NOTE — ED CDU PROVIDER SUBSEQUENT DAY NOTE - PROGRESS NOTE DETAILS
PT reports improvement of her symptoms at time of morning rounds. She was able to tolerate breakfast this morning. Repeat labs show a drop in H & H. PT denies any abdominal pain. Abdomen is soft non tender with no rebound or guarding however will obtain CT abdomen to evaluate for splenic injury due to enlargement of spleen on US. LDH elevated however retic count wnl. IV fluids discontinued and will repeat cbc to evaluate for possible dilutional anemia secondary to fluid administration. Discussed with Dr. Kay

## 2019-03-17 NOTE — H&P ADULT - NSHPLABSRESULTS_GEN_ALL_CORE
11.7   8.0   )-----------( 104      ( 17 Mar 2019 13:40 )             34.4         132<L>  |  99  |  7.0<L>  ----------------------------<  99  4.0   |  21.0<L>  |  0.60    Ca    8.3<L>      17 Mar 2019 13:40    TPro  6.5<L>  /  Alb  3.5  /  TBili  1.3  /  DBili  x   /  AST  268<H>  /  ALT  303<H>  /  AlkPhos  348<H>      PT/INR - ( 16 Mar 2019 14:12 )   PT: 12.6 sec;   INR: 1.09 ratio         PTT - ( 16 Mar 2019 14:12 )  PTT:33.6 sec  Urinalysis Basic - ( 16 Mar 2019 14:22 )    Color: Yellow / Appearance: Clear / S.015 / pH: x  Gluc: x / Ketone: Large  / Bili: Moderate / Urobili: 12 mg/dL   Blood: x / Protein: 30 mg/dL / Nitrite: Positive   Leuk Esterase: Moderate / RBC: 3-5 /HPF / WBC 3-5   Sq Epi: x / Non Sq Epi: Moderate / Bacteria: Moderate    < from: US Abdomen Complete (19 @ 18:13) >    Liver is upper limits of is normal in size.    < end of copied text >    < from: CT Abdomen and Pelvis w/ IV Cont (19 @ 12:41) >    -Significant hepatosplenomegaly, unknown etiology.    < end of copied text >

## 2019-03-17 NOTE — CONSULT NOTE ADULT - ASSESSMENT
Imp:  23 year old female presents with febrile illness; tests pos for Mono.  CBC shows atypical lymph's, in keeping with mono.  Has HSM, also c/w mono.  Inital Hb may have been artificially high as pt presented with dehydration; no evidence of bleeding.  Plt ct may be falling because of splenomegaly.  Unlikely hemolysis as Retic not elevated  Rec:  Observe closely  Will review PS; if CBC does not begin to correct, may need additional studies such as flow cytometry, looking for potential BM pathology.  See orders  Will follow   Thank you

## 2019-03-17 NOTE — H&P ADULT - NSHPPHYSICALEXAM_GEN_ALL_CORE
Vital Signs Last 24 Hrs  T(C): 37.1 (17 Mar 2019 15:16), Max: 39.4 (17 Mar 2019 13:43)  T(F): 98.8 (17 Mar 2019 15:16), Max: 102.9 (17 Mar 2019 13:43)  HR: 102 (17 Mar 2019 15:16) (85 - 113)  BP: 115/74 (17 Mar 2019 15:16) (101/58 - 119/77)  RR: 19 (17 Mar 2019 15:16) (18 - 19)  SpO2: 96% (17 Mar 2019 15:16) (96% - 100%)    PHYSICAL EXAM:    GENERAL: Young female looking comfortable  HEENT: PERRL, +EOMI  NECK: soft, Supple, No JVD  CHEST/LUNG: Clear to auscultate bilaterally; No wheezing  HEART: S1S2+, Regular rate and rhythm; No murmurs  ABDOMEN: Soft, Nontender, Nondistended; Bowel sounds present, organomegaly, nontender on exam, no CVA tenderness  EXTREMITIES:  2+ Peripheral Pulses, No edema  SKIN: No rashes or lesions  NEURO: AAOX3, no focal deficits, no motor r sensory loss  PSYCH: normal mood

## 2019-03-17 NOTE — ED ADULT NURSE REASSESSMENT NOTE - NS ED NURSE REASSESS COMMENT FT1
pt resting in stretcher in NAD, Vital Signs Stable. CBC and CMP drawn and sent to lab this AM. Pt educated on next dose of Rocephin abx @ 1400. All questions and concerns addressed, comfort measures provided.

## 2019-03-17 NOTE — ED CDU PROVIDER SUBSEQUENT DAY NOTE - ATTENDING CONTRIBUTION TO CARE
seen with acp  positive fever  malaise  ct scan shows positive spleno megaly  positive mono test will place in  obs  Agree with acps disposition assessment hx and physical

## 2019-03-17 NOTE — CONSULT NOTE ADULT - ATTENDING COMMENTS
contraindication for VTE due to low plts  med rec  requested heme via ED signout contraindication for VTE due to low plts  med rec  requested heme via ED signout      Follow up name of BCP to see if we continue in hospital

## 2019-03-17 NOTE — H&P ADULT - ASSESSMENT
22 y/o F with Hx Personal and family Hx of Epistaxis, as per her she has been having intermittent fever for last week with L flank pain, initially she started to have fever then after few days flank pain developed her pain is dull and intermittent, yesterday her fever got to 103.5F, hr fever is associated with myalgias, some nausea and fatigue, as per her she had unprotected sex w new partner, after sex she has some vaginal bleeding, she denies any discharge or any more bleeding now, her vaginal pain lasted for few days and then resolved, she has No travel hx or known sick contacts, she denies dysuria, cough, diarrhea, sore throat or swollen glands or joints, dysphagia or odynophagia, she has noted some blood in urine; otherwise, denies bleeding.    In ED had blood and urine cx done, she was  given IV 2 L w maintenance fluids,  Mild cervicitis seen on exam so GC/Chlamydia cx taken, Treated empirically w ceftriaxone/zithromax,  US done showed splenomegaly.  CT scan followed showing hepatosplenomegaly HSM.  [place] test +   LFT abn, Hb 14 dropped to 11.7 w thrombocytopenia.  Had acetaminophen level neg, pos for Mono using agg test  Blood work shows elevated LDH; retic is not elevated; LFT's are elevated;  radiologic studies show HSM; Peripheral smear shows atypical lymphs, myelocytes, being admitted under medicine for further evaluation of Anemia, thrombocytopenia, organomegaly, and fever, give UA with WBCs 0-3, no symptoms less likely UTI.     Plan:     Fever: Likely from Mononucleosis, will give Motrin PRN and low dose tylenole as liver enzymes are elevated, will follow cultures urine and blood, will send viral serology and PCR, will continue with IV fluids.     Anemia: as per patient she has Hx of anemia, her initial Hb might not be hemoconcentration given she came in was dehydrated, she got fluids and repeat Hb is around 11, not knowing baseline,   elevated LDH and nl bili, will send workup for hemolysis, less likely, hematology consult appreciated, will monitor CBC, autoimmune workup ordered, will follow.     Hepatosplenomegaly: Likely from the Infectious mononucleosis, will monitor LFTs as she is noted to have elevated liver enzymes as well, hep panel negative, will get Hepc PCR and Hep B surface antibody, will also get CMV, HSV and EBV PCR.      Mild thrombocytopenia likely due to hypersplenism, will monitor CBC, she has prior hx epistaxis necessitating cautery in past but w/o menorrhagia/metrorrhagia.     DVT prophylaxis: SCDs.

## 2019-03-17 NOTE — ED CDU PROVIDER DISPOSITION NOTE - CLINICAL COURSE
22 y/o female admitted to observation for fever, left sided flank pain and hematuria found to have elevated liver enzymes with + mono spot test. PT was observed overnight and tx with abx for possible pyelonephritis. On morning labs she was found to have a significant drop in H & H.  Fluids were withheld and repeat lab testing showed persistent low H & H.  Hematology consult placed and will admit to medicine to evaluate for possible hemolytic anemia.

## 2019-03-17 NOTE — ED ADULT NURSE REASSESSMENT NOTE - NS ED NURSE REASSESS COMMENT FT1
Assumed pt care at 730A. Patient AOX3, VSS, no sign of distress noted, c/o LUQ abd pain 5/10, patient refused ibuprofen at this time, states that she is comfortable with her pain level. IVF infusing all needs met , nursing care continues.

## 2019-03-17 NOTE — ED ADULT NURSE REASSESSMENT NOTE - NS ED NURSE REASSESS COMMENT FT1
Pt alert and oriented, no apparent distress noted at this time. Pt handed off to sorin MAYFIELD in stable condition.

## 2019-03-17 NOTE — ED CDU PROVIDER DISPOSITION NOTE - ATTENDING CONTRIBUTION TO CARE
seen with acp  hx of flank pain fever ct scan shows positive splenomegaly  patient has a positive mono test  while in obs. patient had an unexplained drop of hgb from 14 to 10  rectal is guiac negative  retic ount is normal  patient is pending a hematology consult will admit to the hospital  Agree with acps disposition assessment hx and physical

## 2019-03-17 NOTE — ED ADULT NURSE REASSESSMENT NOTE - NS ED NURSE REASSESS COMMENT FT1
pt resting in NAD in stretcher. Pt family at bedside. Pt verbalizes no questions, concerns, needs at this time. RN rounding frequently to assess needs.

## 2019-03-18 LAB
ALBUMIN SERPL ELPH-MCNC: 3.2 G/DL — LOW (ref 3.3–5.2)
ALP SERPL-CCNC: 324 U/L — HIGH (ref 40–120)
ALT FLD-CCNC: 263 U/L — HIGH
ANION GAP SERPL CALC-SCNC: 11 MMOL/L — SIGNIFICANT CHANGE UP (ref 5–17)
APTT BLD: 30.1 SEC — SIGNIFICANT CHANGE UP (ref 27.5–36.3)
AST SERPL-CCNC: 210 U/L — HIGH
BASOPHILS # BLD AUTO: 0.3 K/UL — HIGH (ref 0–0.2)
BASOPHILS NFR BLD AUTO: 1 % — SIGNIFICANT CHANGE UP (ref 0–2)
BILIRUB DIRECT SERPL-MCNC: 0.5 MG/DL — HIGH (ref 0–0.3)
BILIRUB INDIRECT FLD-MCNC: 0.5 MG/DL — SIGNIFICANT CHANGE UP (ref 0.2–1)
BILIRUB SERPL-MCNC: 1 MG/DL — SIGNIFICANT CHANGE UP (ref 0.4–2)
BUN SERPL-MCNC: 5 MG/DL — LOW (ref 8–20)
C TRACH RRNA SPEC QL NAA+PROBE: SIGNIFICANT CHANGE UP
CALCIUM SERPL-MCNC: 8.4 MG/DL — LOW (ref 8.6–10.2)
CHLORIDE SERPL-SCNC: 102 MMOL/L — SIGNIFICANT CHANGE UP (ref 98–107)
CO2 SERPL-SCNC: 25 MMOL/L — SIGNIFICANT CHANGE UP (ref 22–29)
CREAT SERPL-MCNC: 0.5 MG/DL — SIGNIFICANT CHANGE UP (ref 0.5–1.3)
EBV EA AB SER IA-ACNC: 17.6 U/ML — HIGH
EBV EA AB TITR SER IF: NEGATIVE — SIGNIFICANT CHANGE UP
EBV EA IGG SER-ACNC: POSITIVE
EBV NA IGG SER IA-ACNC: <3 U/ML — SIGNIFICANT CHANGE UP
EBV PATRN SPEC IB-IMP: SIGNIFICANT CHANGE UP
EBV VCA IGG AVIDITY SER QL IA: POSITIVE
EBV VCA IGM SER IA-ACNC: 25.1 U/ML — HIGH
EBV VCA IGM SER IA-ACNC: >160 U/ML — HIGH
EBV VCA IGM TITR FLD: POSITIVE
EOSINOPHIL # BLD AUTO: 0 K/UL — SIGNIFICANT CHANGE UP (ref 0–0.5)
FERRITIN SERPL-MCNC: 1522 NG/ML — HIGH (ref 15–150)
FOLATE SERPL-MCNC: 16.6 NG/ML — SIGNIFICANT CHANGE UP
GLUCOSE SERPL-MCNC: 118 MG/DL — HIGH (ref 70–115)
HAPTOGLOB SERPL-MCNC: <20 MG/DL — LOW (ref 34–200)
HCT VFR BLD CALC: 33.5 % — LOW (ref 37–47)
HGB BLD-MCNC: 11.3 G/DL — LOW (ref 12–16)
IGA FLD-MCNC: 172 MG/DL — SIGNIFICANT CHANGE UP (ref 84–499)
IGG FLD-MCNC: 1017 MG/DL — SIGNIFICANT CHANGE UP (ref 610–1660)
IGM SERPL-MCNC: 155 MG/DL — SIGNIFICANT CHANGE UP (ref 35–242)
INR BLD: 0.98 RATIO — SIGNIFICANT CHANGE UP (ref 0.88–1.16)
IRON SATN MFR SERPL: 23 % — SIGNIFICANT CHANGE UP (ref 14–50)
IRON SATN MFR SERPL: 73 UG/DL — SIGNIFICANT CHANGE UP (ref 37–145)
LACTATE SERPL-SCNC: 1.3 MMOL/L — SIGNIFICANT CHANGE UP (ref 0.5–2)
LDH SERPL L TO P-CCNC: 536 U/L — HIGH (ref 98–192)
LYMPHOCYTES # BLD AUTO: 42 % — SIGNIFICANT CHANGE UP (ref 20–55)
LYMPHOCYTES # BLD AUTO: 5.1 K/UL — HIGH (ref 1–4.8)
MCHC RBC-ENTMCNC: 30.3 PG — SIGNIFICANT CHANGE UP (ref 27–31)
MCHC RBC-ENTMCNC: 33.7 G/DL — SIGNIFICANT CHANGE UP (ref 32–36)
MCV RBC AUTO: 89.8 FL — SIGNIFICANT CHANGE UP (ref 81–99)
MONOCYTES # BLD AUTO: 0.8 K/UL — SIGNIFICANT CHANGE UP (ref 0–0.8)
MONOCYTES NFR BLD AUTO: 11 % — HIGH (ref 3–10)
N GONORRHOEA RRNA SPEC QL NAA+PROBE: SIGNIFICANT CHANGE UP
NEUTROPHILS # BLD AUTO: 1.6 K/UL — LOW (ref 1.8–8)
NEUTROPHILS NFR BLD AUTO: 34 % — LOW (ref 37–73)
NEUTS BAND # BLD: 6 % — SIGNIFICANT CHANGE UP (ref 0–8)
PLAT MORPH BLD: NORMAL — SIGNIFICANT CHANGE UP
PLATELET # BLD AUTO: 117 K/UL — LOW (ref 150–400)
POTASSIUM SERPL-MCNC: 3.9 MMOL/L — SIGNIFICANT CHANGE UP (ref 3.5–5.3)
POTASSIUM SERPL-SCNC: 3.9 MMOL/L — SIGNIFICANT CHANGE UP (ref 3.5–5.3)
PROCALCITONIN SERPL-MCNC: 0.25 NG/ML — HIGH (ref 0–0.04)
PROT SERPL-MCNC: 6.4 G/DL — LOW (ref 6.6–8.7)
PROTHROM AB SERPL-ACNC: 11.3 SEC — SIGNIFICANT CHANGE UP (ref 10–12.9)
RBC # BLD: 3.73 M/UL — LOW (ref 4.4–5.2)
RBC # BLD: 3.73 M/UL — LOW (ref 4.4–5.2)
RBC # FLD: 13 % — SIGNIFICANT CHANGE UP (ref 11–15.6)
RBC BLD AUTO: SIGNIFICANT CHANGE UP
RETICS #: 1.6 K/UL — LOW (ref 25–125)
RETICS/RBC NFR: 0.4 % — LOW (ref 0.5–2.5)
SODIUM SERPL-SCNC: 138 MMOL/L — SIGNIFICANT CHANGE UP (ref 135–145)
SPECIMEN SOURCE: SIGNIFICANT CHANGE UP
TIBC SERPL-MCNC: 313 UG/DL — SIGNIFICANT CHANGE UP (ref 220–430)
TRANSFERRIN SERPL-MCNC: 219 MG/DL — SIGNIFICANT CHANGE UP (ref 192–382)
VARIANT LYMPHS # BLD: 6 % — SIGNIFICANT CHANGE UP (ref 0–6)
VIT B12 SERPL-MCNC: 1156 PG/ML — SIGNIFICANT CHANGE UP (ref 232–1245)
WBC # BLD: 7.8 K/UL — SIGNIFICANT CHANGE UP (ref 4.8–10.8)
WBC # FLD AUTO: 7.8 K/UL — SIGNIFICANT CHANGE UP (ref 4.8–10.8)

## 2019-03-18 PROCEDURE — 99232 SBSQ HOSP IP/OBS MODERATE 35: CPT

## 2019-03-18 RX ORDER — SIMETHICONE 80 MG/1
80 TABLET, CHEWABLE ORAL ONCE
Qty: 0 | Refills: 0 | Status: DISCONTINUED | OUTPATIENT
Start: 2019-03-18 | End: 2019-03-20

## 2019-03-18 RX ADMIN — Medication 600 MILLIGRAM(S): at 20:00

## 2019-03-18 RX ADMIN — SODIUM CHLORIDE 125 MILLILITER(S): 9 INJECTION INTRAMUSCULAR; INTRAVENOUS; SUBCUTANEOUS at 10:10

## 2019-03-18 RX ADMIN — Medication 600 MILLIGRAM(S): at 18:36

## 2019-03-18 RX ADMIN — SODIUM CHLORIDE 125 MILLILITER(S): 9 INJECTION INTRAMUSCULAR; INTRAVENOUS; SUBCUTANEOUS at 18:24

## 2019-03-18 NOTE — CHART NOTE - NSCHARTNOTEFT_GEN_A_CORE
S:  Nurse c/o pt c distended abd, pt c/o gas & gerd, has hx of same, pt recently restarted p.o. diet; pt admits to continuing mild upper abd pain, no change since yesterday;  pt endorses good appetite and nl bowel movements and denies nausea.    O:  pt a&o x 3, vss,  no acute distress, pleasant reliable historian, speaking full sentences, head of bed is raised at 30 degrees prior to exam.  abd soft, nondistended, slight ruq & luq tenderness on palpation, spleen & liver palpaple, normoactive bowel sounds, no guarding, no rebound T    A:  24yo female adm c likely acute ebv syndrome, hepatosplenomegaly on exam, no distention, pmh of gerd, now c dyspepsia    P:  Raise head of bed to 60 degrees while awake       Maalox prn       cont present mgt       Hem/Onc following       Close observation

## 2019-03-18 NOTE — PROGRESS NOTE PEDS - ASSESSMENT
Imp:  23 year old female presents with febrile illness; tests pos for Mono.  CBC shows atypical lymph's, in keeping with mono.  Has HSM, also c/w mono.  Inital Hb may have been artificially high as pt presented with dehydration; no evidence of bleeding.  Plt ct may be falling because of splenomegaly.  Unlikely hemolysis as Retic not elevated.  HB and plt ct improved today; LFT's also improved  Still febrile  Rec:  Observe closely  Will review PS; if CBC does not begin to correct, may need additional studies such as flow cytometry, looking for potential BM pathology.  See orders  Discussed with patient-to see on follow up post discharge to ensure abnormalities resolve  Will follow   Thank you

## 2019-03-18 NOTE — PROGRESS NOTE PEDS - SUBJECTIVE AND OBJECTIVE BOX
BHUPINDER PABLO    [id number]    23y      Female    Patient is a 23y old  Female who presents with a chief complaint of Fever and flank pain (17 Mar 2019 18:52)      INTERVAL HPI/OVERNIGHT EVENTS:    REVIEW OF SYSTEMS:    CONSTITUTIONAL: has fever, some fatigue  RESPIRATORY: No cough, No shortness of breath  CARDIOVASCULAR: No chest pain, palpitations  GASTROINTESTINAL: No abdominal, some nausea, no vomiting, flank pain  NEUROLOGICAL: No headaches,  loss of strength.  MISCELLANEOUS: No joint swelling or pain       Vital Signs Last 24 Hrs  T(C): 39.2 (18 Mar 2019 18:25), Max: 39.2 (18 Mar 2019 18:25)  T(F): 102.5 (18 Mar 2019 18:25), Max: 102.5 (18 Mar 2019 18:25)  HR: 103 (18 Mar 2019 16:14) (90 - 116)  BP: 125/79 (18 Mar 2019 08:06) (100/62 - 125/79)  BP(mean): --  RR: 16 (18 Mar 2019 16:14) (16 - 20)  SpO2: 100% (18 Mar 2019 16:14) (99% - 100%)    PHYSICAL EXAM:    GENERAL: Young female looking comfortable  HEENT: PERRL, +EOMI  NECK: soft, Supple, No JVD  CHEST/LUNG: Clear to auscultate bilaterally; No wheezing  HEART: S1S2+, Regular rate and rhythm; No murmurs  ABDOMEN: Soft, Nontender, Nondistended; Bowel sounds present, organomegaly, nontender on exam, no CVA tenderness  EXTREMITIES:  2+ Peripheral Pulses, No edema  SKIN: No rashes or lesions  NEURO: AAOX3, no focal deficits, no motor r sensory loss  PSYCH: normal mood      LABS:                        11.3   7.8   )-----------( 117      ( 18 Mar 2019 09:39 )             33.5     03-18    138  |  102  |  5.0<L>  ----------------------------<  118<H>  3.9   |  25.0  |  0.50    Ca    8.4<L>      18 Mar 2019 09:39    TPro  6.4<L>  /  Alb  3.2<L>  /  TBili  1.0  /  DBili  0.5<H>  /  AST  210<H>  /  ALT  263<H>  /  AlkPhos  324<H>  03-18    PT/INR - ( 18 Mar 2019 09:39 )   PT: 11.3 sec;   INR: 0.98 ratio         PTT - ( 18 Mar 2019 09:39 )  PTT:30.1 sec        I&O's Summary    18 Mar 2019 07:01  -  18 Mar 2019 19:46  --------------------------------------------------------  IN: 1125 mL / OUT: 0 mL / NET: 1125 mL        MEDICATIONS  (STANDING):  sodium chloride 0.9%. 1000 milliLiter(s) (125 mL/Hr) IV Continuous <Continuous>    MEDICATIONS  (PRN):  acetaminophen   Tablet .. 650 milliGRAM(s) Oral every 6 hours PRN Temp greater or equal to 38C (100.4F), Mild Pain (1 - 3)  aluminum hydroxide/magnesium hydroxide/simethicone Suspension 30 milliLiter(s) Oral every 4 hours PRN Dyspepsia  ibuprofen  Tablet. 600 milliGRAM(s) Oral every 6 hours PRN Temp greater or equal to 38C (100.4F), Moderate Pain (4 - 6)  ondansetron Injectable 4 milliGRAM(s) IV Push every 6 hours PRN Nausea BHUPINDER PABLO    [id number]    23y      Female    Patient is a 23y old  Female who presents with a chief complaint of Fever and flank pain (17 Mar 2019 18:52)      INTERVAL HPI/OVERNIGHT EVENTS:    Patient is feeling ok, has some fullness in the abdomen, pain is better, tmax last night was 101f    REVIEW OF SYSTEMS:    CONSTITUTIONAL: has fever, some fatigue  RESPIRATORY: No cough, No shortness of breath  CARDIOVASCULAR: No chest pain, palpitations  GASTROINTESTINAL: No abdominal, some nausea, no vomiting  NEUROLOGICAL: No headaches,  loss of strength.  MISCELLANEOUS: No joint swelling or pain       Vital Signs Last 24 Hrs  T(C): 39.2 (18 Mar 2019 18:25), Max: 39.2 (18 Mar 2019 18:25)  T(F): 102.5 (18 Mar 2019 18:25), Max: 102.5 (18 Mar 2019 18:25)  HR: 103 (18 Mar 2019 16:14) (90 - 116)  BP: 125/79 (18 Mar 2019 08:06) (100/62 - 125/79)  BP(mean): --  RR: 16 (18 Mar 2019 16:14) (16 - 20)  SpO2: 100% (18 Mar 2019 16:14) (99% - 100%)    PHYSICAL EXAM:    GENERAL: Young female looking comfortable  HEENT: PERRL, +EOMI  NECK: soft, Supple, No JVD  CHEST/LUNG: Clear to auscultate bilaterally; No wheezing  HEART: S1S2+, Regular rate and rhythm; No murmurs  ABDOMEN: Soft, Nontender, Nondistended; Bowel sounds present, organomegaly, nontender on exam, no CVA tenderness  EXTREMITIES:  2+ Peripheral Pulses, No edema  SKIN: No rashes or lesions  NEURO: AAOX3, no focal deficits, no motor r sensory loss  PSYCH: normal mood      LABS:                        11.3   7.8   )-----------( 117      ( 18 Mar 2019 09:39 )             33.5     03-18    138  |  102  |  5.0<L>  ----------------------------<  118<H>  3.9   |  25.0  |  0.50    Ca    8.4<L>      18 Mar 2019 09:39    TPro  6.4<L>  /  Alb  3.2<L>  /  TBili  1.0  /  DBili  0.5<H>  /  AST  210<H>  /  ALT  263<H>  /  AlkPhos  324<H>  03-18    PT/INR - ( 18 Mar 2019 09:39 )   PT: 11.3 sec;   INR: 0.98 ratio         PTT - ( 18 Mar 2019 09:39 )  PTT:30.1 sec        I&O's Summary    18 Mar 2019 07:01  -  18 Mar 2019 19:46  --------------------------------------------------------  IN: 1125 mL / OUT: 0 mL / NET: 1125 mL        MEDICATIONS  (STANDING):  sodium chloride 0.9%. 1000 milliLiter(s) (125 mL/Hr) IV Continuous <Continuous>    MEDICATIONS  (PRN):  acetaminophen   Tablet .. 650 milliGRAM(s) Oral every 6 hours PRN Temp greater or equal to 38C (100.4F), Mild Pain (1 - 3)  aluminum hydroxide/magnesium hydroxide/simethicone Suspension 30 milliLiter(s) Oral every 4 hours PRN Dyspepsia  ibuprofen  Tablet. 600 milliGRAM(s) Oral every 6 hours PRN Temp greater or equal to 38C (100.4F), Moderate Pain (4 - 6)  ondansetron Injectable 4 milliGRAM(s) IV Push every 6 hours PRN Nausea

## 2019-03-18 NOTE — PROGRESS NOTE PEDS - ASSESSMENT
24 y/o F with Hx Personal and family Hx of Epistaxis, as per her she has been having intermittent fever for last week with L flank pain, initially she started to have fever then after few days flank pain developed her pain is dull and intermittent, yesterday her fever got to 103.5F, hr fever is associated with myalgias, some nausea and fatigue, as per her she had unprotected sex w new partner, after sex she has some vaginal bleeding, she denies any discharge or any more bleeding now, her vaginal pain lasted for few days and then resolved, she has No travel hx or known sick contacts, she denies dysuria, cough, diarrhea, sore throat or swollen glands or joints, dysphagia or odynophagia, she has noted some blood in urine; otherwise, denies bleeding.    In ED had blood and urine cx done, she was  given IV 2 L w maintenance fluids,  Mild cervicitis seen on exam so GC/Chlamydia cx taken, Treated empirically w ceftriaxone/zithromax,  US done showed splenomegaly.  CT scan followed showing hepatosplenomegaly HSM.  [place] test +   LFT abn, Hb 14 dropped to 11.7 w thrombocytopenia.  Had acetaminophen level neg, pos for Mono using agg test  Blood work shows elevated LDH; retic is not elevated; LFT's are elevated;  radiologic studies show HSM; Peripheral smear shows atypical lymphs, myelocytes, being admitted under medicine for further evaluation of Anemia, thrombocytopenia, organomegaly, and fever, give UA with WBCs 0-3, no symptoms less likely UTI.     Plan:     Fever: Likely from Mononucleosis, will give Motrin PRN and low dose tylenole as liver enzymes are elevated, will follow cultures urine and blood, will send viral serology and PCR, will continue with IV fluids.     Anemia: as per patient she has Hx of anemia, her initial Hb might not be hemoconcentration given she came in was dehydrated, she got fluids and repeat Hb is around 11, not knowing baseline,   elevated LDH and nl bili, will send workup for hemolysis, less likely, hematology consult appreciated, will monitor CBC, autoimmune workup ordered, will follow.     Hepatosplenomegaly: Likely from the Infectious mononucleosis, will monitor LFTs as she is noted to have elevated liver enzymes as well, hep panel negative, will get Hepc PCR and Hep B surface antibody, will also get CMV, HSV and EBV PCR.      Mild thrombocytopenia likely due to hypersplenism, will monitor CBC, she has prior hx epistaxis necessitating cautery in past but w/o menorrhagia/metrorrhagia.     DVT prophylaxis: SCDs. 22 y/o F with Hx Personal and family Hx of Epistaxis, as per her she has been having intermittent fever for last week with L flank pain, initially she started to have fever then after few days flank pain developed her pain is dull and intermittent, yesterday her fever got to 103.5F, hr fever is associated with myalgias, some nausea and fatigue, as per her she had unprotected sex w new partner, after sex she has some vaginal bleeding, she denies any discharge or any more bleeding now, her vaginal pain lasted for few days and then resolved, she has No travel hx or known sick contacts, she denies dysuria, cough, diarrhea, sore throat or swollen glands or joints, dysphagia or odynophagia, she has noted some blood in urine; otherwise, denies bleeding.    In ED had blood and urine cx done, she was  given IV 2 L w maintenance fluids,  Mild cervicitis seen on exam so GC/Chlamydia cx taken, Treated empirically w ceftriaxone/zithromax,  US done showed splenomegaly.  CT scan followed showing hepatosplenomegaly HSM.  [place] test +   LFT abn, Hb 14 dropped to 11.7 w thrombocytopenia.  Had acetaminophen level neg, pos for Mono using agg test  Blood work shows elevated LDH; retic is not elevated; LFT's are elevated;  radiologic studies show HSM; Peripheral smear shows atypical lymphs, myelocytes, being admitted under medicine for further evaluation of Anemia, thrombocytopenia, organomegaly, and fever, give UA with WBCs 0-3, no symptoms less likely UTI.     Plan:     Fever: Likely from Mononucleosis, Continue Motrin PRN and low dose tylenole as liver enzymes are elevated, cultures urine and blood are negative, will send viral serology and PCR pending, will continue with IV fluids.     Anemia: as per patient she has Hx of anemia, her initial Hb might not be hemoconcentration given she came in was dehydrated, she got fluids and repeat Hb is around 11, not knowing baseline,   elevated LDH and nl bili, workup for hemolysis is pending but less like hemolysis, hematology is following, H&H has been stable, will monitor CBC, autoimmune workup ordered and is pending as well, will follow and continue to monitor CBC.     Hepatosplenomegaly: Likely from the Infectious mononucleosis, will monitor LFTs as she is noted to have elevated liver enzymes as well, hep panel negative, will get Hepc PCR and Hep B surface antibody, will also get CMV, HSV and EBV PCR, LFTs are trending down.      Mild thrombocytopenia likely due to hypersplenism, will monitor CBC, she has prior hx epistaxis necessitating cautery in past but w/o menorrhagia/metrorrhagia.     DVT prophylaxis: SCDs.

## 2019-03-18 NOTE — PROGRESS NOTE PEDS - SUBJECTIVE AND OBJECTIVE BOX
HPI: Patient is a 23y Female initially seen on consultation for the evaluation and management of anemia and thrombocytopenia.  She has no known PMHx and was in her USOH until last week when she developed fevers to 102 degrees, associated with chills, nausea, one episode of vomiting.  Denied localizing symptoms; denies dysuria, cough, diarrhea.  Developed LUQ pain 2 days prior.  Still notes low grade fevers, poor po intake over this week.  Denies sore throat or swollen glands, denies dysphagia or odynophagia.  has noted blood in urine; otherwise, denies bleeding.  has occasional epistaxis.  Tests pos for Mono using agg test.  Blood work shows elevated LDH; retic is not elevated; LFT's are elevated;  radiologic studies show HSM; Peripheral smear shows atypical lymphs, myelocytes, metamyelocytes.  Had headaches with fever; denies dizziness; denies bone or joint pain.      Interval history:  Patient seen on follow up.  Feels better, but still febrile.  Denies SOB or chest pain.  Aware of abdominal discomfort; denies dysuria or hematuria    PAST MEDICAL & SURGICAL HISTORY:  No pertinent past medical history  No significant past surgical history      REVIEW OF SYSTEMS      General:fevers	    Skin/Breast:no rash or lesion  	  Ophthalmologic:no blurry vision or diplopia  	  ENMT:	no erythema, pus or swelling    Respiratory and Thorax:clear,no SOB or wheezing  	  Cardiovascular:	no chest pain or palpitations    Gastrointestinal:	notes LUQ pain  Genitourinary:	notes red colored urine    Musculoskeletal:	no bone or joint pain    Neurological:	no seizure or tremor      Hematology/Lymphatics:	no c/o LN        MEDICATIONS  (STANDING):  sodium chloride 0.9%. 1000 milliLiter(s) (125 mL/Hr) IV Continuous <Continuous>    MEDICATIONS  (PRN):  acetaminophen   Tablet .. 650 milliGRAM(s) Oral every 6 hours PRN Temp greater or equal to 38C (100.4F), Mild Pain (1 - 3)  aluminum hydroxide/magnesium hydroxide/simethicone Suspension 30 milliLiter(s) Oral every 4 hours PRN Dyspepsia  ibuprofen  Tablet. 600 milliGRAM(s) Oral every 6 hours PRN Temp greater or equal to 38C (100.4F), Moderate Pain (4 - 6)  ondansetron Injectable 4 milliGRAM(s) IV Push every 6 hours PRN Nausea      Allergies    No Known Drug Allergies  Nuts (Anaphylaxis)    Intolerances        SOCIAL HISTORY:    Smoking Status:denied  Alcohol:denied    Occupation:Nursing student    FAMILY HISTORY:no FHx of maligancy in first degree relatives            	            Vital Signs Last 24 Hrs  T(C): 37.1 (17 Mar 2019 15:16), Max: 39.4 (17 Mar 2019 13:43)  T(F): 98.8 (17 Mar 2019 15:16), Max: 102.9 (17 Mar 2019 13:43)  HR: 102 (17 Mar 2019 15:16) (85 - 113)  BP: 115/74 (17 Mar 2019 15:16) (101/58 - 119/77)  BP(mean): --  RR: 19 (17 Mar 2019 15:16) (18 - 19)  SpO2: 96% (17 Mar 2019 15:16) (96% - 100%)    PHYSICAL EXAM:      Constitutional:WDWN healthy appearing, awake, alert, NAD    Eyes:anicteric    ENMT:no lesion, no erythema, no evidence of infection    Neck:no adenopathy      Respiratory:Clear, without wheezing or rhonchi    Cardiovascular:RRR normal S1S2    Gastrointestinal:soft, palpable spleen tip          Extremities:no c/C/Edema            Skin:no rash    Lymph Nodes:no adenoptahy                LABS:  Lab 3/18/19:  WBC 7.8; H/H 11.3/33.5; plt ct 117,000;  34% P, 42% L;  6% reactive lymphs ferritin 1522;                         11.7   8.0   )-----------( 104      ( 17 Mar 2019 13:40 )             34.4         132<L>  |  99  |  7.0<L>  ----------------------------<  99  4.0   |  21.0<L>  |  0.60    Ca    8.3<L>      17 Mar 2019 13:40    TPro  6.5<L>  /  Alb  3.5  /  TBili  1.3  /  DBili  x   /  AST  268<H>  /  ALT  303<H>  /  AlkPhos  348<H>      PT/INR - ( 16 Mar 2019 14:12 )   PT: 12.6 sec;   INR: 1.09 ratio         PTT - ( 16 Mar 2019 14:12 )  PTT:33.6 sec  Urinalysis Basic - ( 16 Mar 2019 14:22 )    Color: Yellow / Appearance: Clear / S.015 / pH: x  Gluc: x / Ketone: Large  / Bili: Moderate / Urobili: 12 mg/dL   Blood: x / Protein: 30 mg/dL / Nitrite: Positive   Leuk Esterase: Moderate / RBC: 3-5 /HPF / WBC 3-5   Sq Epi: x / Non Sq Epi: Moderate / Bacteria: Moderate        RADIOLOGY & ADDITIONAL STUDIES:

## 2019-03-19 LAB
ALBUMIN SERPL ELPH-MCNC: 3 G/DL — LOW (ref 3.3–5.2)
ALP SERPL-CCNC: 302 U/L — HIGH (ref 40–120)
ALT FLD-CCNC: 237 U/L — HIGH
ANION GAP SERPL CALC-SCNC: 9 MMOL/L — SIGNIFICANT CHANGE UP (ref 5–17)
ANISOCYTOSIS BLD QL: SLIGHT — SIGNIFICANT CHANGE UP
AST SERPL-CCNC: 165 U/L — HIGH
BASOPHILS # BLD AUTO: 0.4 K/UL — HIGH (ref 0–0.2)
BILIRUB SERPL-MCNC: 0.6 MG/DL — SIGNIFICANT CHANGE UP (ref 0.4–2)
BUN SERPL-MCNC: 7 MG/DL — LOW (ref 8–20)
CALCIUM SERPL-MCNC: 7.9 MG/DL — LOW (ref 8.6–10.2)
CHLORIDE SERPL-SCNC: 106 MMOL/L — SIGNIFICANT CHANGE UP (ref 98–107)
CO2 SERPL-SCNC: 26 MMOL/L — SIGNIFICANT CHANGE UP (ref 22–29)
CREAT SERPL-MCNC: 0.54 MG/DL — SIGNIFICANT CHANGE UP (ref 0.5–1.3)
DSDNA AB SER-ACNC: 28 IU/ML — SIGNIFICANT CHANGE UP
EOSINOPHIL # BLD AUTO: 0 K/UL — SIGNIFICANT CHANGE UP (ref 0–0.5)
FIBRINOGEN PPP-MCNC: 332 MG/DL — SIGNIFICANT CHANGE UP (ref 242–442)
GLUCOSE SERPL-MCNC: 101 MG/DL — SIGNIFICANT CHANGE UP (ref 70–115)
HAPTOGLOB SERPL-MCNC: <20 MG/DL — LOW (ref 34–200)
HBV SURFACE AB SER-ACNC: REACTIVE
HCT VFR BLD CALC: 32.1 % — LOW (ref 37–47)
HCV RNA SERPL NAA DL=5-ACNC: SIGNIFICANT CHANGE UP IU/ML
HCV RNA SPEC NAA+PROBE-LOG IU: SIGNIFICANT CHANGE UP LOGIU/ML
HGB BLD-MCNC: 10.9 G/DL — LOW (ref 12–16)
LYMPHOCYTES # BLD AUTO: 36 % — SIGNIFICANT CHANGE UP (ref 20–55)
LYMPHOCYTES # BLD AUTO: 6.4 K/UL — HIGH (ref 1–4.8)
MACROCYTES BLD QL: SLIGHT — SIGNIFICANT CHANGE UP
MCHC RBC-ENTMCNC: 30.4 PG — SIGNIFICANT CHANGE UP (ref 27–31)
MCHC RBC-ENTMCNC: 34 G/DL — SIGNIFICANT CHANGE UP (ref 32–36)
MCV RBC AUTO: 89.7 FL — SIGNIFICANT CHANGE UP (ref 81–99)
MICROCYTES BLD QL: SLIGHT — SIGNIFICANT CHANGE UP
MONOCYTES # BLD AUTO: 1.3 K/UL — HIGH (ref 0–0.8)
MONOCYTES NFR BLD AUTO: 9 % — SIGNIFICANT CHANGE UP (ref 3–10)
NEUTROPHILS # BLD AUTO: 2.1 K/UL — SIGNIFICANT CHANGE UP (ref 1.8–8)
NEUTROPHILS NFR BLD AUTO: 42 % — SIGNIFICANT CHANGE UP (ref 37–73)
NEUTS BAND # BLD: 2 % — SIGNIFICANT CHANGE UP (ref 0–8)
OVALOCYTES BLD QL SMEAR: SLIGHT — SIGNIFICANT CHANGE UP
PLAT MORPH BLD: ABNORMAL
PLATELET # BLD AUTO: 125 K/UL — LOW (ref 150–400)
POIKILOCYTOSIS BLD QL AUTO: SLIGHT — SIGNIFICANT CHANGE UP
POTASSIUM SERPL-MCNC: 4.2 MMOL/L — SIGNIFICANT CHANGE UP (ref 3.5–5.3)
POTASSIUM SERPL-SCNC: 4.2 MMOL/L — SIGNIFICANT CHANGE UP (ref 3.5–5.3)
PROT SERPL-MCNC: 6.1 G/DL — LOW (ref 6.6–8.7)
RBC # BLD: 3.58 M/UL — LOW (ref 4.4–5.2)
RBC # FLD: 13.1 % — SIGNIFICANT CHANGE UP (ref 11–15.6)
RBC BLD AUTO: ABNORMAL
SODIUM SERPL-SCNC: 141 MMOL/L — SIGNIFICANT CHANGE UP (ref 135–145)
VARIANT LYMPHS # BLD: 11 % — HIGH (ref 0–6)
WBC # BLD: 10.2 K/UL — SIGNIFICANT CHANGE UP (ref 4.8–10.8)
WBC # FLD AUTO: 10.2 K/UL — SIGNIFICANT CHANGE UP (ref 4.8–10.8)

## 2019-03-19 PROCEDURE — 99232 SBSQ HOSP IP/OBS MODERATE 35: CPT

## 2019-03-19 RX ORDER — POLYETHYLENE GLYCOL 3350 17 G/17G
17 POWDER, FOR SOLUTION ORAL DAILY
Qty: 0 | Refills: 0 | Status: DISCONTINUED | OUTPATIENT
Start: 2019-03-19 | End: 2019-03-20

## 2019-03-19 RX ORDER — SODIUM CHLORIDE 9 MG/ML
1000 INJECTION INTRAMUSCULAR; INTRAVENOUS; SUBCUTANEOUS
Qty: 0 | Refills: 0 | Status: DISCONTINUED | OUTPATIENT
Start: 2019-03-19 | End: 2019-03-20

## 2019-03-19 RX ADMIN — POLYETHYLENE GLYCOL 3350 17 GRAM(S): 17 POWDER, FOR SOLUTION ORAL at 18:13

## 2019-03-19 RX ADMIN — SODIUM CHLORIDE 100 MILLILITER(S): 9 INJECTION INTRAMUSCULAR; INTRAVENOUS; SUBCUTANEOUS at 18:20

## 2019-03-19 RX ADMIN — Medication 600 MILLIGRAM(S): at 16:15

## 2019-03-19 RX ADMIN — SODIUM CHLORIDE 125 MILLILITER(S): 9 INJECTION INTRAMUSCULAR; INTRAVENOUS; SUBCUTANEOUS at 10:56

## 2019-03-19 RX ADMIN — Medication 600 MILLIGRAM(S): at 07:56

## 2019-03-19 RX ADMIN — Medication 600 MILLIGRAM(S): at 05:56

## 2019-03-19 RX ADMIN — Medication 600 MILLIGRAM(S): at 15:33

## 2019-03-19 NOTE — PROGRESS NOTE PEDS - ASSESSMENT
Imp:  23 year old female presents with febrile illness; tests pos for Mono.  CBC shows atypical lymph's, in keeping with mono.  Has HSM, also c/w mono.      Suspect CBC profile will normalize over the next few week as mono resolves. Patient advised to avoid contact sports    To follow in clinic in one week to follow labs    Haptoglobin is low but Hgb remains stable with improving LFTs. monitor CBC.

## 2019-03-19 NOTE — PROGRESS NOTE PEDS - SUBJECTIVE AND OBJECTIVE BOX
BHUPINDER PABLO    [id number]    23y      Female    Patient is a 23y old  Female who presents with a chief complaint of Fever (18 Mar 2019 19:45)      INTERVAL HPI/OVERNIGHT EVENTS:    Patient is having intermittent fever, has some fullness in the abdomen, pain is better, tmax last was 101.8 today, she spiked fever couple of times.     REVIEW OF SYSTEMS:    CONSTITUTIONAL: has intermittent fever, some fatigue  RESPIRATORY: No cough, No shortness of breath  CARDIOVASCULAR: No chest pain, palpitations  GASTROINTESTINAL: No abdominal, some nausea, no vomiting  NEUROLOGICAL: No headaches,  loss of strength.  MISCELLANEOUS: No joint swelling or pain       Vital Signs Last 24 Hrs  T(C): 38.8 (19 Mar 2019 15:35), Max: 39.2 (18 Mar 2019 18:25)  T(F): 101.9 (19 Mar 2019 15:35), Max: 102.6 (19 Mar 2019 06:01)  HR: 105 (19 Mar 2019 15:35) (83 - 112)  BP: 101/64 (19 Mar 2019 11:12) (95/58 - 111/68)  RR: 20 (19 Mar 2019 15:35) (18 - 20)  SpO2: 100% (19 Mar 2019 15:35) (98% - 100%)    PHYSICAL EXAM:    GENERAL: Young female looking comfortable  HEENT: PERRL, +EOMI  NECK: soft, Supple, No JVD  CHEST/LUNG: Clear to auscultate bilaterally; No wheezing  HEART: S1S2+, Regular rate and rhythm; No murmurs  ABDOMEN: Soft, Nontender, Nondistended; Bowel sounds present, organomegaly, nontender on exam, no CVA tenderness  EXTREMITIES:  2+ Peripheral Pulses, No edema  SKIN: No rashes or lesions  NEURO: AAOX3, no focal deficits, no motor r sensory loss  PSYCH: normal mood        LABS:                        10.9   10.2  )-----------( 125      ( 19 Mar 2019 05:48 )             32.1     03-19    141  |  106  |  7.0<L>  ----------------------------<  101  4.2   |  26.0  |  0.54    Ca    7.9<L>      19 Mar 2019 05:48    TPro  6.1<L>  /  Alb  3.0<L>  /  TBili  0.6  /  DBili  x   /  AST  165<H>  /  ALT  237<H>  /  AlkPhos  302<H>  03-19    PT/INR - ( 18 Mar 2019 09:39 )   PT: 11.3 sec;   INR: 0.98 ratio         PTT - ( 18 Mar 2019 09:39 )  PTT:30.1 sec        I&O's Summary    18 Mar 2019 07:01  -  19 Mar 2019 07:00  --------------------------------------------------------  IN: 2625 mL / OUT: 0 mL / NET: 2625 mL    19 Mar 2019 07:01  -  19 Mar 2019 17:23  --------------------------------------------------------  IN: 1075 mL / OUT: 0 mL / NET: 1075 mL        MEDICATIONS  (STANDING):  polyethylene glycol 3350 17 Gram(s) Oral daily  simethicone 80 milliGRAM(s) Chew once  sodium chloride 0.9%. 1000 milliLiter(s) (100 mL/Hr) IV Continuous <Continuous>    MEDICATIONS  (PRN):  acetaminophen   Tablet .. 650 milliGRAM(s) Oral every 6 hours PRN Temp greater or equal to 38C (100.4F), Mild Pain (1 - 3)  aluminum hydroxide/magnesium hydroxide/simethicone Suspension 30 milliLiter(s) Oral every 4 hours PRN Dyspepsia  bisacodyl 5 milliGRAM(s) Oral every 12 hours PRN Constipation  ibuprofen  Tablet. 600 milliGRAM(s) Oral every 6 hours PRN Temp greater or equal to 38C (100.4F), Moderate Pain (4 - 6)  ondansetron Injectable 4 milliGRAM(s) IV Push every 6 hours PRN Nausea

## 2019-03-19 NOTE — PROGRESS NOTE PEDS - SUBJECTIVE AND OBJECTIVE BOX
HPI: Patient is a 23y Female initially seen on consultation for the evaluation and management of anemia and thrombocytopenia.  She has no known PMHx and was in her USOH until last week when she developed fevers to 102 degrees, associated with chills, nausea, one episode of vomiting.  Denied localizing symptoms; denies dysuria, cough, diarrhea.  Developed LUQ pain 2 days prior.  Still notes low grade fevers, poor po intake over this week.  Denies sore throat or swollen glands, denies dysphagia or odynophagia.  has noted blood in urine; otherwise, denies bleeding.  has occasional epistaxis.  Tests pos for Mono using agg test.  Blood work shows elevated LDH; retic is not elevated; LFT's are elevated;  radiologic studies show HSM; Peripheral smear shows atypical lymphs, myelocytes, metamyelocytes.  Had headaches with fever; denies dizziness; denies bone or joint pain.      Interval history:  Patient states she is feeling better. no fevers. mild abd distension    PAST MEDICAL & SURGICAL HISTORY:  No pertinent past medical history  No significant past surgical history      REVIEW OF SYSTEMS              MEDICATIONS  (STANDING):  simethicone 80 milliGRAM(s) Chew once  sodium chloride 0.9%. 1000 milliLiter(s) (125 mL/Hr) IV Continuous <Continuous>        Allergies    No Known Drug Allergies  Nuts (Anaphylaxis)    Intolerances        SOCIAL HISTORY:    Smoking Status:denied  Alcohol:denied    Occupation:Nursing student    FAMILY HISTORY:no FHx of maligancy in first degree relatives            	            Vital Signs Last 24 Hrs  Vital Signs Last 24 Hrs  T(C): 37 (19 Mar 2019 11:12), Max: 39.2 (18 Mar 2019 18:25)  T(F): 98.6 (19 Mar 2019 11:12), Max: 102.6 (19 Mar 2019 06:01)  HR: 98 (19 Mar 2019 11:12) (83 - 112)  BP: 101/64 (19 Mar 2019 11:12) (95/58 - 111/68)  BP(mean): --  RR: 20 (19 Mar 2019 11:12) (16 - 20)  SpO2: 99% (19 Mar 2019 11:12) (98% - 100%)    PHYSICAL EXAM:      Constitutional:WDWN healthy appearing, awake, alert, NAD    Eyes:anicteric    ENMT:no lesion, no erythema, no evidence of infection    Neck:no adenopathy      Respiratory:Clear, without wheezing or rhonchi    Cardiovascular:RRR normal S1S2    Gastrointestinal:soft, palpable spleen tip          Extremities:no c/C/Edema            Skin:no rash    Lymph Nodes:no adenoptahy                LABS:              10.9                 141  | 26.0 | 7.0          10.2  >-----------< 125     ------------------------< 101                   32.1                 4.2  | 106  | 0.54                                         Ca 7.9   Mg x     Ph x              RADIOLOGY & ADDITIONAL STUDIES:

## 2019-03-19 NOTE — PROGRESS NOTE PEDS - ASSESSMENT
22 y/o F with Hx Personal and family Hx of Epistaxis, as per her she has been having intermittent fever for last week with L flank pain, initially she started to have fever then after few days flank pain developed her pain is dull and intermittent, yesterday her fever got to 103.5F, hr fever is associated with myalgias, some nausea and fatigue, as per her she had unprotected sex w new partner, after sex she has some vaginal bleeding, she denies any discharge or any more bleeding now, her vaginal pain lasted for few days and then resolved, she has No travel hx or known sick contacts, she denies dysuria, cough, diarrhea, sore throat or swollen glands or joints, dysphagia or odynophagia, she has noted some blood in urine; otherwise, denies bleeding.    In ED had blood and urine cx done, she was  given IV 2 L w maintenance fluids,  Mild cervicitis seen on exam so GC/Chlamydia cx taken, Treated empirically w ceftriaxone/zithromax,  US done showed splenomegaly.  CT scan followed showing hepatosplenomegaly HSM.  [place] test +   LFT abn, Hb 14 dropped to 11.7 w thrombocytopenia.  Had acetaminophen level neg, pos for Mono using agg test  Blood work shows elevated LDH; retic is not elevated; LFT's are elevated;  radiologic studies show HSM; Peripheral smear shows atypical lymphs, myelocytes, being admitted under medicine for further evaluation of Anemia, thrombocytopenia, organomegaly, and fever, give UA with WBCs 0-3, no symptoms less likely UTI.     Plan:     Fever: Likely from Mononucleosis, Continue Motrin PRN and low dose tylenole as liver enzymes are elevated, cultures urine and blood are negative, viral serology is postive for Mono, PCR pending, will continue with IV fluids.     Anemia: as per patient she has Hx of anemia, her initial Hb might not be hemoconcentration given she came in was dehydrated, she got fluids and repeat Hb is around 11, not knowing baseline, elevated LDH and nl bili, haptoglobin is low but Hemoglobin is stable, hematology is following, will monitor CBC, autoimmune workup ordered and is pending as well, will follow and continue to monitor CBC, IgA, IgM and IgG are WNL.     Hepatosplenomegaly: Likely from the Infectious mononucleosis, will monitor LFTs as she is noted to have elevated liver enzymes as well, hep panel negative, will get HepC PCR and Hep B surface antibody +ve and rest is negative, will also get CMV, HSV and EBV PCR, LFTs are trending down.      Mild thrombocytopenia likely due to hypersplenism, will monitor CBC, she has prior hx epistaxis necessitating cautery in past but w/o menorrhagia/metrorrhagia, platelet count is improving.     Constipation: Bowel meds.     DVT prophylaxis: SCDs.

## 2019-03-20 ENCOUNTER — TRANSCRIPTION ENCOUNTER (OUTPATIENT)
Age: 24
End: 2019-03-20

## 2019-03-20 VITALS
RESPIRATION RATE: 18 BRPM | HEART RATE: 95 BPM | OXYGEN SATURATION: 100 % | TEMPERATURE: 98 F | DIASTOLIC BLOOD PRESSURE: 67 MMHG | SYSTOLIC BLOOD PRESSURE: 104 MMHG

## 2019-03-20 LAB
ANA TITR SER: NEGATIVE — SIGNIFICANT CHANGE UP
CMV DNA CSF QL NAA+PROBE: SIGNIFICANT CHANGE UP
CMV DNA SPEC NAA+PROBE-LOG#: SIGNIFICANT CHANGE UP LOGIU/ML
EBV EA AB SER IA-ACNC: <5 U/ML — SIGNIFICANT CHANGE UP
EBV EA AB TITR SER IF: NEGATIVE — SIGNIFICANT CHANGE UP
EBV EA IGG SER-ACNC: NEGATIVE — SIGNIFICANT CHANGE UP
EBV NA IGG SER IA-ACNC: <3 U/ML — SIGNIFICANT CHANGE UP
EBV PATRN SPEC IB-IMP: SIGNIFICANT CHANGE UP
EBV VCA IGG AVIDITY SER QL IA: NEGATIVE — SIGNIFICANT CHANGE UP
EBV VCA IGM SER IA-ACNC: <10 U/ML — SIGNIFICANT CHANGE UP
EBV VCA IGM SER IA-ACNC: >160 U/ML — HIGH
EBV VCA IGM TITR FLD: POSITIVE

## 2019-03-20 PROCEDURE — 86665 EPSTEIN-BARR CAPSID VCA: CPT

## 2019-03-20 PROCEDURE — 87591 N.GONORRHOEAE DNA AMP PROB: CPT

## 2019-03-20 PROCEDURE — 83540 ASSAY OF IRON: CPT

## 2019-03-20 PROCEDURE — 84466 ASSAY OF TRANSFERRIN: CPT

## 2019-03-20 PROCEDURE — 74177 CT ABD & PELVIS W/CONTRAST: CPT

## 2019-03-20 PROCEDURE — 99239 HOSP IP/OBS DSCHRG MGMT >30: CPT

## 2019-03-20 PROCEDURE — 87491 CHLMYD TRACH DNA AMP PROBE: CPT

## 2019-03-20 PROCEDURE — 76700 US EXAM ABDOM COMPLETE: CPT

## 2019-03-20 PROCEDURE — 83605 ASSAY OF LACTIC ACID: CPT

## 2019-03-20 PROCEDURE — 86225 DNA ANTIBODY NATIVE: CPT

## 2019-03-20 PROCEDURE — 96375 TX/PRO/DX INJ NEW DRUG ADDON: CPT | Mod: XU

## 2019-03-20 PROCEDURE — 82728 ASSAY OF FERRITIN: CPT

## 2019-03-20 PROCEDURE — 87799 DETECT AGENT NOS DNA QUANT: CPT

## 2019-03-20 PROCEDURE — 86038 ANTINUCLEAR ANTIBODIES: CPT

## 2019-03-20 PROCEDURE — 96365 THER/PROPH/DIAG IV INF INIT: CPT

## 2019-03-20 PROCEDURE — 85027 COMPLETE CBC AUTOMATED: CPT

## 2019-03-20 PROCEDURE — 84145 PROCALCITONIN (PCT): CPT

## 2019-03-20 PROCEDURE — 83010 ASSAY OF HAPTOGLOBIN QUANT: CPT

## 2019-03-20 PROCEDURE — 82272 OCCULT BLD FECES 1-3 TESTS: CPT

## 2019-03-20 PROCEDURE — G0378: CPT

## 2019-03-20 PROCEDURE — 87086 URINE CULTURE/COLONY COUNT: CPT

## 2019-03-20 PROCEDURE — 85730 THROMBOPLASTIN TIME PARTIAL: CPT

## 2019-03-20 PROCEDURE — 81001 URINALYSIS AUTO W/SCOPE: CPT

## 2019-03-20 PROCEDURE — 86706 HEP B SURFACE ANTIBODY: CPT

## 2019-03-20 PROCEDURE — 82607 VITAMIN B-12: CPT

## 2019-03-20 PROCEDURE — 87522 HEPATITIS C REVRS TRNSCRPJ: CPT

## 2019-03-20 PROCEDURE — 96361 HYDRATE IV INFUSION ADD-ON: CPT

## 2019-03-20 PROCEDURE — 83550 IRON BINDING TEST: CPT

## 2019-03-20 PROCEDURE — 81025 URINE PREGNANCY TEST: CPT

## 2019-03-20 PROCEDURE — 84702 CHORIONIC GONADOTROPIN TEST: CPT

## 2019-03-20 PROCEDURE — 76856 US EXAM PELVIC COMPLETE: CPT

## 2019-03-20 PROCEDURE — 80053 COMPREHEN METABOLIC PANEL: CPT

## 2019-03-20 PROCEDURE — 82784 ASSAY IGA/IGD/IGG/IGM EACH: CPT

## 2019-03-20 PROCEDURE — 80074 ACUTE HEPATITIS PANEL: CPT

## 2019-03-20 PROCEDURE — 80048 BASIC METABOLIC PNL TOTAL CA: CPT

## 2019-03-20 PROCEDURE — 80307 DRUG TEST PRSMV CHEM ANLYZR: CPT

## 2019-03-20 PROCEDURE — 86664 EPSTEIN-BARR NUCLEAR ANTIGEN: CPT

## 2019-03-20 PROCEDURE — 85610 PROTHROMBIN TIME: CPT

## 2019-03-20 PROCEDURE — 85045 AUTOMATED RETICULOCYTE COUNT: CPT

## 2019-03-20 PROCEDURE — 80076 HEPATIC FUNCTION PANEL: CPT

## 2019-03-20 PROCEDURE — 82746 ASSAY OF FOLIC ACID SERUM: CPT

## 2019-03-20 PROCEDURE — 36415 COLL VENOUS BLD VENIPUNCTURE: CPT

## 2019-03-20 PROCEDURE — 87040 BLOOD CULTURE FOR BACTERIA: CPT

## 2019-03-20 PROCEDURE — 99285 EMERGENCY DEPT VISIT HI MDM: CPT

## 2019-03-20 PROCEDURE — 86308 HETEROPHILE ANTIBODY SCREEN: CPT

## 2019-03-20 PROCEDURE — 86663 EPSTEIN-BARR ANTIBODY: CPT

## 2019-03-20 PROCEDURE — 83615 LACTATE (LD) (LDH) ENZYME: CPT

## 2019-03-20 RX ORDER — POLYETHYLENE GLYCOL 3350 17 G/17G
17 POWDER, FOR SOLUTION ORAL
Qty: 0 | Refills: 0 | COMMUNITY
Start: 2019-03-20

## 2019-03-20 RX ORDER — IBUPROFEN 200 MG
1 TABLET ORAL
Qty: 0 | Refills: 0 | COMMUNITY
Start: 2019-03-20

## 2019-03-20 RX ORDER — ACETAMINOPHEN 500 MG
2 TABLET ORAL
Qty: 0 | Refills: 0 | COMMUNITY
Start: 2019-03-20

## 2019-03-20 RX ADMIN — POLYETHYLENE GLYCOL 3350 17 GRAM(S): 17 POWDER, FOR SOLUTION ORAL at 10:27

## 2019-03-20 RX ADMIN — SODIUM CHLORIDE 100 MILLILITER(S): 9 INJECTION INTRAMUSCULAR; INTRAVENOUS; SUBCUTANEOUS at 04:04

## 2019-03-20 RX ADMIN — Medication 600 MILLIGRAM(S): at 04:17

## 2019-03-20 NOTE — DISCHARGE NOTE PROVIDER - NSDCFUADDAPPT_GEN_ALL_CORE_FT
take advil as needed for the fever, she can also take tylenole as need but only 1 to 2 times a day, not more then that,   follow up with PCP in 1 week to check the liver enzymes and CBC, Hematology clinic in 1 week

## 2019-03-20 NOTE — PROGRESS NOTE PEDS - SUBJECTIVE AND OBJECTIVE BOX
HPI: Patient is a 23y Female initially seen on consultation for the evaluation and management of anemia and thrombocytopenia.  She has no known PMHx and was in her USOH until last week when she developed fevers to 102 degrees, associated with chills, nausea, one episode of vomiting.  Denied localizing symptoms; denies dysuria, cough, diarrhea.  Developed LUQ pain 2 days prior.  Still notes low grade fevers, poor po intake over this week.  Denies sore throat or swollen glands, denies dysphagia or odynophagia.  has noted blood in urine; otherwise, denies bleeding.  has occasional epistaxis.  Tests pos for Mono using agg test.  Blood work shows elevated LDH; retic is not elevated; LFT's are elevated;  radiologic studies show HSM; Peripheral smear shows atypical lymphs, myelocytes, metamyelocytes.  Had headaches with fever; denies dizziness; denies bone or joint pain.      Interval history:  Patient states she is feeling better. episode of fever, now afebrile. mild abd distension      PAST MEDICAL & SURGICAL HISTORY:  No pertinent past medical history  No significant past surgical history      ROS:  Negative except for:    MEDICATIONS  (STANDING):  polyethylene glycol 3350 17 Gram(s) Oral daily  simethicone 80 milliGRAM(s) Chew once  sodium chloride 0.9%. 1000 milliLiter(s) (100 mL/Hr) IV Continuous <Continuous>    MEDICATIONS  (PRN):  acetaminophen   Tablet .. 650 milliGRAM(s) Oral every 6 hours PRN Temp greater or equal to 38C (100.4F), Mild Pain (1 - 3)  aluminum hydroxide/magnesium hydroxide/simethicone Suspension 30 milliLiter(s) Oral every 4 hours PRN Dyspepsia  bisacodyl 5 milliGRAM(s) Oral every 12 hours PRN Constipation  ibuprofen  Tablet. 600 milliGRAM(s) Oral every 6 hours PRN Temp greater or equal to 38C (100.4F), Moderate Pain (4 - 6)  ondansetron Injectable 4 milliGRAM(s) IV Push every 6 hours PRN Nausea      Allergies    No Known Drug Allergies  Nuts (Anaphylaxis)    Intolerances        Vital Signs Last 24 Hrs  T(C): 36.9 (20 Mar 2019 12:10), Max: 38.8 (19 Mar 2019 15:35)  T(F): 98.5 (20 Mar 2019 12:10), Max: 101.9 (19 Mar 2019 15:35)  HR: 95 (20 Mar 2019 12:10) (93 - 105)  BP: 104/67 (20 Mar 2019 12:10) (101/64 - 107/71)  BP(mean): --  RR: 18 (20 Mar 2019 12:10) (18 - 20)  SpO2: 100% (20 Mar 2019 12:10) (98% - 100%)    PHYSICAL EXAM  General: adult in NAD, comfortable  CV: normal S1/S2 with no murmur rubs or gallops  Lungs: clear  Abdomen: soft non-tender mildly distended, + enlarged spleen  Ext: no clubbing cyanosis or edema        LABS:                          10.9   10.2  )-----------( 125      ( 19 Mar 2019 05:48 )             32.1     Serial CBC's   @ 05:48  Hct-32.1 / Hgb-10.9 / Plat-125 / RBC-3.58 / WBC-10.2                141  |  106  |  7.0<L>  ----------------------------<  101  4.2   |  26.0  |  0.54    Ca    7.9<L>      19 Mar 2019 05:48    TPro  6.1<L>  /  Alb  3.0<L>  /  TBili  0.6  /  DBili  x   /  AST  165<H>  /  ALT  237<H>  /  AlkPhos  302<H>            Hematocrit: 32.1 % ( @ 05:48)  Hemoglobin: 10.9 g/dL ( @ 05:48)      Quantitative IgA: 172 mg/dL ( @ 17:50)  Quantitative Ig mg/dL ( @ 17:50)        RADIOLOGY & ADDITIONAL STUDIES:

## 2019-03-20 NOTE — DISCHARGE NOTE PROVIDER - NSDCCPCAREPLAN_GEN_ALL_CORE_FT
PRINCIPAL DISCHARGE DIAGNOSIS  Diagnosis: Anemia  Assessment and Plan of Treatment: Get your CBC checked in 1 week      SECONDARY DISCHARGE DIAGNOSES  Diagnosis: Spleen enlarged  Assessment and Plan of Treatment: Avoid contact.    Diagnosis: Enlarged liver  Assessment and Plan of Treatment: Avoid contact, get your Liver function checked in 1 week.    Diagnosis: Mononucleosis  Assessment and Plan of Treatment: Avoid contract sports, take advil for fever

## 2019-03-20 NOTE — DISCHARGE NOTE PROVIDER - HOSPITAL COURSE
24 y/o F with Hx Personal and family Hx of Epistaxis, as per her she has been having intermittent fever for last week with L flank pain, initially she started to have fever then     after few days flank pain developed her pain is dull and intermittent, yesterday her fever got to 103.5F, hr fever is associated with myalgias, some nausea and fatigue,     as per her she had unprotected sex w new partner, after sex she has some vaginal bleeding, she denies any discharge or any more bleeding now, her vaginal pain lasted for few     days and then resolved, she has No travel hx or known sick contacts, she denies dysuria, cough, diarrhea, sore throat or swollen glands or joints, dysphagia or odynophagia, she has noted some blood in urine; otherwise, denies bleeding,     in the ED had blood and urine cx done, she was  given IV 2 L w maintenance fluids,  Mild cervicitis seen on exam so GC/Chlamydia cx taken, Treated empirically with     ceftriaxone/zithromax,  US done showed splenomegaly.  CT scan followed showing hepatosplenomegaly HSM.  [place] test +   LFT abn, Hb 14 dropped to 11.7 w thrombocytopenia.    Had acetaminophen level neg, pos for Mono using agg test Blood work shows elevated LDH; retic is not elevated; LFT's are elevated;  radiologic studies show HSM; Peripheral smear shows atypical lymphs, myelocytes, being admitted under medicine for further evaluation of Anemia, thrombocytopenia, organomegaly, and fever, give UA with WBCs 0-3, no symptoms less likely UTI.    patient was admitted in medicine and was continue with IV fluids, her Fever has been most Likely from Mononucleosis, continue Motrin PRN and low dose tylenole as liver enzymes    are elevated, cultures urine and blood are negative, viral serology is positive for Mono, PCR pending, her hepatosplenomegaly is Likely from the Infectious mononucleosis as well,      she is noted to have elevated liver enzymes as well, hep panel negative, HepC PCR and Hep B surface antibody +ve and rest is negative, CMV PCR, Hepatitis C PCR is also negative, LFTs are trending down.     Patient was noted to have anemia, her initial Hb might be hemoconcentration given she came in was dehydrated, she got fluids and repeat Hb is around 11, autoimmune workup     IgA, IgM and IgG are WNL, elevated LDH and nl bili, haptoglobin is low but Hemoglobin is stable, hematology followed the patient over the course and her H&H remained stable.     she was also noted to have mild thrombocytopenia likely due to hypersplenism, she has prior hx epistaxis necessitating cautery in past but w/o menorrhagia/metrorrhagia, platelet count is better, 127k.     she is still having intermittent fever, she was told to take advil as needed for the fever, she can also take tylenole as need but only 1 to 2 times a day, not more then that,     follow up with PCP in 1 week to check the liver enzymes and CBC, Hematology clinic in 1 week    as well to get your CBC checked, she was told to avoid contact sports, no dancing or strenuous exercise.     patient is doing ok, she is being discharge home in a stable condition.         Vital Signs Last 24 Hrs    T(C): 36.9 (20 Mar 2019 12:10), Max: 38.8 (19 Mar 2019 15:35)    T(F): 98.5 (20 Mar 2019 12:10), Max: 101.9 (19 Mar 2019 15:35)    HR: 95 (20 Mar 2019 12:10) (93 - 105)    BP: 104/67 (20 Mar 2019 12:10) (101/64 - 107/71)    BP(mean): --    RR: 18 (20 Mar 2019 12:10) (18 - 20)    SpO2: 100% (20 Mar 2019 12:10) (98% - 100%)        PHYSICAL EXAM:        GENERAL: Young female looking comfortable    HEENT: PERRL, +EOMI    NECK: soft, Supple, No JVD    CHEST/LUNG: Clear to auscultate bilaterally; No wheezing    HEART: S1S2+, Regular rate and rhythm; No murmurs    ABDOMEN: Soft, Nontender, Nondistended; Bowel sounds present, organomegaly, nontender on exam, no CVA tenderness    EXTREMITIES:  2+ Peripheral Pulses, No edema    SKIN: No rashes or lesions    NEURO: AAOX3, no focal deficits, no motor r sensory loss    PSYCH: normal mood        Total time spent 39 minutes

## 2019-03-20 NOTE — PROGRESS NOTE PEDS - ASSESSMENT
febrile illness due to infectious mononucleosis.  CBC shows atypical lymph's, in keeping with mono.  Has HSM, also c/w mono.    Suspect CBC profile will normalize over the next few week as mono resolves. Patient advised to avoid contact sports    Haptoglobin is low but Hgb remains stable with improving LFTs. monitor CBC.     Once stable, outpt follow up with Dr. Calle   discussed with patient's mom at bedside

## 2019-03-20 NOTE — DISCHARGE NOTE PROVIDER - CARE PROVIDER_API CALL
Gigi Calle)  Medicine  [address] [place] [address]  Phone: [phone]  Fax: [phone]  Follow Up Time:     PMD,   in 1 week, please call the office and get an appiotment  Phone: (   )    -  Fax: (   )    -  Follow Up Time:

## 2019-03-20 NOTE — DISCHARGE NOTE NURSING/CASE MANAGEMENT/SOCIAL WORK - NSDCDPATPORTLINK_GEN_ALL_CORE
You can access the [facility] Portal, offered by [facility], by registering with the following website: [email or web address]

## 2019-03-20 NOTE — DISCHARGE NOTE PROVIDER - PROVIDER TOKENS
PROVIDER:[TOKEN:[[id number]]],FREE:[LAST:[PMD],PHONE:[(   )    -],FAX:[(   )    -],ADDRESS:[in 1 week, please call the office and get an appiotment]]

## 2019-03-21 LAB
CULTURE RESULTS: SIGNIFICANT CHANGE UP
CULTURE RESULTS: SIGNIFICANT CHANGE UP
SPECIMEN SOURCE: SIGNIFICANT CHANGE UP
SPECIMEN SOURCE: SIGNIFICANT CHANGE UP

## 2019-03-22 LAB
EBV DNA SERPL NAA+PROBE-ACNC: 3720 IU/ML — HIGH
EBVPCR LOG: 3.57 LOGIU/ML — HIGH

## 2020-12-07 ENCOUNTER — APPOINTMENT (OUTPATIENT)
Dept: OTOLARYNGOLOGY | Facility: CLINIC | Age: 25
End: 2020-12-07
Payer: COMMERCIAL

## 2020-12-07 VITALS
BODY MASS INDEX: 45.99 KG/M2 | TEMPERATURE: 97.2 F | HEART RATE: 72 BPM | SYSTOLIC BLOOD PRESSURE: 115 MMHG | HEIGHT: 67 IN | WEIGHT: 293 LBS | DIASTOLIC BLOOD PRESSURE: 75 MMHG

## 2020-12-07 DIAGNOSIS — L04.0 ACUTE LYMPHADENITIS OF FACE, HEAD AND NECK: ICD-10-CM

## 2020-12-07 PROCEDURE — 99072 ADDL SUPL MATRL&STAF TM PHE: CPT

## 2020-12-07 PROCEDURE — 99203 OFFICE O/P NEW LOW 30 MIN: CPT

## 2020-12-07 RX ORDER — TERCONAZOLE 80 MG/1
80 SUPPOSITORY VAGINAL
Qty: 3 | Refills: 0 | Status: DISCONTINUED | COMMUNITY
Start: 2020-07-09

## 2020-12-07 RX ORDER — FLUCONAZOLE 150 MG/1
150 TABLET ORAL
Qty: 4 | Refills: 0 | Status: DISCONTINUED | COMMUNITY
Start: 2020-07-13

## 2020-12-07 RX ORDER — VALACYCLOVIR 1 G/1
1 TABLET, FILM COATED ORAL
Qty: 20 | Refills: 0 | Status: DISCONTINUED | COMMUNITY
Start: 2020-11-20

## 2020-12-07 RX ORDER — AZITHROMYCIN 250 MG/1
250 TABLET, FILM COATED ORAL
Qty: 6 | Refills: 0 | Status: DISCONTINUED | COMMUNITY
Start: 2020-08-20

## 2020-12-07 NOTE — REVIEW OF SYSTEMS
[Post Nasal Drip] : post nasal drip [Ear Pain] : ear pain [Ear Itch] : ear itch [Dizziness] : dizziness [Vertigo] : vertigo [Throat Clearing] : throat clearing [Throat Dryness] : throat dryness [Swollen Glands] : swollen glands [Nose Bleeds] : nose bleeds [As Noted in HPI] : as noted in HPI [Negative] : Head and Neck [de-identified] : has been cauterized in the past

## 2020-12-07 NOTE — HISTORY OF PRESENT ILLNESS
[de-identified] : 25 yr old female c/o swollen tonsils since 10/16/2020.  Tonsillitis (-strep) 8/18/2020 tx w Zpak. c/o left sided throat pain and otalgia AS getting worse since Thanksgiving.\par -prior hx of tonsillitis\par -hx strep \par +mono 3/2018 w anemia since then.  Saw hematologist prior to Thanksgiving w resolution of anemia.  EBV titers no re-activation of mono.\par +hx otitis recurrent [id number], not since then

## 2020-12-07 NOTE — PHYSICAL EXAM
[] : septum deviated bilaterally [Normal] : gums are normal [de-identified] : small mobile tender lymph node left level 11 [de-identified] : enlarged w erythema [de-identified] : 3+ bilat w ertyhema left w tiny pus sup pole

## 2020-12-22 ENCOUNTER — APPOINTMENT (OUTPATIENT)
Dept: OTOLARYNGOLOGY | Facility: CLINIC | Age: 25
End: 2020-12-22
Payer: COMMERCIAL

## 2020-12-22 VITALS
WEIGHT: 115 LBS | HEIGHT: 67 IN | HEART RATE: 79 BPM | DIASTOLIC BLOOD PRESSURE: 73 MMHG | BODY MASS INDEX: 18.05 KG/M2 | TEMPERATURE: 97.2 F | SYSTOLIC BLOOD PRESSURE: 109 MMHG

## 2020-12-22 PROCEDURE — 99072 ADDL SUPL MATRL&STAF TM PHE: CPT

## 2020-12-22 PROCEDURE — 99214 OFFICE O/P EST MOD 30 MIN: CPT | Mod: 25

## 2020-12-22 PROCEDURE — 31575 DIAGNOSTIC LARYNGOSCOPY: CPT

## 2020-12-22 RX ORDER — NORETHINDRONE ACETATE AND ETHINYL ESTRADIOL 1MG-20(21)
KIT ORAL
Refills: 0 | Status: DISCONTINUED | COMMUNITY
End: 2020-12-22

## 2020-12-22 RX ORDER — AMOXICILLIN AND CLAVULANATE POTASSIUM 875; 125 MG/1; MG/1
875-125 TABLET, COATED ORAL
Qty: 20 | Refills: 0 | Status: DISCONTINUED | COMMUNITY
Start: 2020-12-07 | End: 2020-12-22

## 2020-12-22 NOTE — ASSESSMENT
[FreeTextEntry1] : no evidenced of tonsillitis, otitis or LPR\par doubt that small inclusion cyst is the source of discomfort\par discussed watchful waiting, CT, steroids, unlikely candidate for tonsillectomy\par f/u prn failure to improve

## 2020-12-22 NOTE — REVIEW OF SYSTEMS
[Post Nasal Drip] : post nasal drip [Ear Pain] : ear pain [Ear Itch] : ear itch [Dizziness] : dizziness [Vertigo] : vertigo [Nose Bleeds] : nose bleeds [As Noted in HPI] : as noted in HPI [Throat Clearing] : throat clearing [Throat Dryness] : throat dryness [Swollen Glands] : swollen glands [Negative] : Head and Neck [de-identified] : has been cauterized in the past BPH with obstruction/lower urinary tract symptoms    Diabetes mellitus    GERD (gastroesophageal reflux disease)    Hyperlipidemia    Hypertension    Smoker    Vitamin D deficiency

## 2020-12-22 NOTE — HISTORY OF PRESENT ILLNESS
[de-identified] : 25 yr old female c/o swollen tonsils since 10/16/2020.  Tonsillitis (-strep) 8/18/2020 tx w Zpak. c/o left sided throat pain and otalgia AS getting worse since Thanksgiving.   12/7 tx w Augmentin, felt a bit better for a little while.  Doesn't think tonsil ever returned to its normal size.  See a pus pocket again, not sure if it's the same one.  Does feel as badly as she did  3 weeks ago. -fever\par +otalgia AS when she swallows\par -prior hx of tonsillitis\par -hx strep \par +mono 3/2018 w anemia since then.  Saw hematologist prior to Thanksgiving w resolution of anemia.  EBV titers no re-activation of mono.\par +hx GERD, but not recently\par +hx otitis recurrent [id number], not since then

## 2020-12-22 NOTE — PHYSICAL EXAM
[] : septum deviated bilaterally [Normal] : inferior turbinates and middle turbinates are normal [de-identified] : 3+ bilat w tiny mucoasl inclusion cyst left superior pole

## 2021-01-05 ENCOUNTER — TRANSCRIPTION ENCOUNTER (OUTPATIENT)
Age: 26
End: 2021-01-05

## 2021-01-05 ENCOUNTER — APPOINTMENT (OUTPATIENT)
Dept: OTOLARYNGOLOGY | Facility: CLINIC | Age: 26
End: 2021-01-05
Payer: COMMERCIAL

## 2021-01-05 VITALS
DIASTOLIC BLOOD PRESSURE: 74 MMHG | WEIGHT: 118 LBS | HEIGHT: 67 IN | BODY MASS INDEX: 18.52 KG/M2 | SYSTOLIC BLOOD PRESSURE: 109 MMHG | TEMPERATURE: 97.4 F

## 2021-01-05 DIAGNOSIS — J03.90 ACUTE TONSILLITIS, UNSPECIFIED: ICD-10-CM

## 2021-01-05 PROCEDURE — 99213 OFFICE O/P EST LOW 20 MIN: CPT

## 2021-01-05 PROCEDURE — 99072 ADDL SUPL MATRL&STAF TM PHE: CPT

## 2021-01-05 RX ORDER — IRON/IRON ASP GLY/FA/MV-MIN 38 125-25-1MG
TABLET ORAL
Refills: 0 | Status: ACTIVE | COMMUNITY

## 2021-01-05 RX ORDER — MULTIVITAMIN
TABLET ORAL
Refills: 0 | Status: ACTIVE | COMMUNITY

## 2021-01-05 RX ORDER — NORETHINDRONE ACETATE AND ETHINYL ESTRADIOL AND FERROUS FUMARATE 1.5-30(21)
1.5-3 KIT ORAL
Qty: 84 | Refills: 0 | Status: COMPLETED | COMMUNITY
Start: 2020-10-23 | End: 2021-01-05

## 2021-01-05 NOTE — HISTORY OF PRESENT ILLNESS
[de-identified] : 25 yr old female c/o swollen tonsils since 10/16/2020.  Tonsillitis (-strep) 8/18/2020 tx w Zpak. c/o left sided throat pain and otalgia AS getting worse since Thanksgiving.   12/7 tx w Augmentin, felt a bit better for a little while.  Doesn't think tonsil ever returned to its normal size.  \par exam including FFL 12/22/2020 WNL\par +otalgia AS when she swallows for 2 days w white spots left tonsil and swollen gland\par 3rd time w bad sore throat since 8/2020\par -prior hx of tonsillitis\par -hx strep \par +mono 3/2018 w anemia since then.  Saw hematologist prior to Thanksgiving w resolution of anemia.  EBV titers no re-activation of mono.\par +hx GERD, but not recently\par +hx otitis recurrent [id number], not since then

## 2021-01-05 NOTE — PHYSICAL EXAM
[] : septum deviated bilaterally [Normal] : gums are normal [de-identified] : 2cm tender left level 2 lymph node [de-identified] : 3+ with erythema and exudate on the left

## 2021-01-05 NOTE — REVIEW OF SYSTEMS
[Post Nasal Drip] : post nasal drip [Ear Pain] : ear pain [Ear Itch] : ear itch [Dizziness] : dizziness [Vertigo] : vertigo [Nose Bleeds] : nose bleeds [As Noted in HPI] : as noted in HPI [Throat Clearing] : throat clearing [Throat Dryness] : throat dryness [Swollen Glands] : swollen glands [Negative] : Head and Neck [de-identified] : has been cauterized in the past

## 2021-01-12 ENCOUNTER — APPOINTMENT (OUTPATIENT)
Dept: OTOLARYNGOLOGY | Facility: CLINIC | Age: 26
End: 2021-01-12
Payer: COMMERCIAL

## 2021-01-12 VITALS
HEIGHT: 67 IN | SYSTOLIC BLOOD PRESSURE: 109 MMHG | TEMPERATURE: 97.5 F | DIASTOLIC BLOOD PRESSURE: 75 MMHG | BODY MASS INDEX: 18.52 KG/M2 | WEIGHT: 118 LBS

## 2021-01-12 DIAGNOSIS — R07.0 PAIN IN THROAT: ICD-10-CM

## 2021-01-12 DIAGNOSIS — J34.2 DEVIATED NASAL SEPTUM: ICD-10-CM

## 2021-01-12 DIAGNOSIS — H92.02 OTALGIA, LEFT EAR: ICD-10-CM

## 2021-01-12 PROCEDURE — 99072 ADDL SUPL MATRL&STAF TM PHE: CPT

## 2021-01-12 PROCEDURE — 99214 OFFICE O/P EST MOD 30 MIN: CPT

## 2021-01-12 RX ORDER — NORGESTIMATE AND ETHINYL ESTRADIOL 7DAYSX3 LO
0.18/0.215/0.25 KIT ORAL
Qty: 84 | Refills: 0 | Status: ACTIVE | COMMUNITY
Start: 2020-11-20

## 2021-01-12 NOTE — HISTORY OF PRESENT ILLNESS
[de-identified] : The patient presents with h/o tonsillitis, throat pain, otalgia. Pt is still on abx and she is still getting throat pain, pain with swallowing and otalgia with laying down. She is also getting stiff necks. She had lab work done by her PCP which was normal. Denies night sweats. She is having trouble sleeping because of her sx. Pt is scheduled to get her covid-19 vaccine tomorrow 1/12/2020.

## 2021-01-12 NOTE — CONSULT LETTER
[Courtesy Letter:] : I had the pleasure of seeing your patient, [unfilled], in my office today. [Please see my note below.] : Please see my note below. [Consult Closing:] : Thank you very much for allowing me to participate in the care of this patient.  If you have any questions, please do not hesitate to contact me. [Sincerely,] : Sincerely, [FreeTextEntry3] : Ulises Jonas MD FACS

## 2021-01-12 NOTE — PHYSICAL EXAM
[de-identified] : cervical lymphadenopathy level 2 and level 5 on the left, level 2 on the right [de-identified] : symmetric, class 3 [de-identified] : abdomen normal to palpation

## 2021-01-12 NOTE — ASSESSMENT
[FreeTextEntry1] : Reviewed and reconciled medications, allergies, PMHx, PSHx, SocHx, FMHx.\par \par h/o tonsillitis, throat pain, otalgia - continued throat pain and otalgia\par pain with swallowing\par tonsils class 3 and symmetric\par \par Plan:\par Finish abx then get lab work done. Lab work: LEESA, CBC, CMP, CMV IgG antibody, EBV, lyme western blot panel, SED rate. Start Prednisone 10 days after vaccine - calendar provided, start at day 5 for 8 day course. FU after course of steroids.

## 2021-01-12 NOTE — ADDENDUM
[FreeTextEntry1] : Documented by Callie Silva acting as scribe for Dr. Jonas on 01/12/2021.\par \par All Medical record entries made by the Scribe were at my, Dr. Jonas, direction and personally dictated by me on 01/12/2021 . I have reviewed the chart and agree that the record accurately reflects my personal performance of the history, physical exam, assessment and plan. I have also personally directed, reviewed, and agreed with the discharge instructions.\par

## 2021-01-19 LAB
ALBUMIN SERPL ELPH-MCNC: 4.7 G/DL
ALP BLD-CCNC: 63 U/L
ALT SERPL-CCNC: 13 U/L
ANA SER IF-ACNC: NEGATIVE
ANION GAP SERPL CALC-SCNC: 17 MMOL/L
AST SERPL-CCNC: 17 U/L
B BURGDOR AB SER-IMP: NEGATIVE
B BURGDOR IGM PATRN SER IB-IMP: NEGATIVE
B BURGDOR18KD IGG SER QL IB: NORMAL
B BURGDOR23KD IGG SER QL IB: PRESENT
B BURGDOR23KD IGM SER QL IB: NORMAL
B BURGDOR28KD IGG SER QL IB: NORMAL
B BURGDOR30KD IGG SER QL IB: NORMAL
B BURGDOR31KD IGG SER QL IB: NORMAL
B BURGDOR39KD IGG SER QL IB: NORMAL
B BURGDOR39KD IGM SER QL IB: NORMAL
B BURGDOR41KD IGG SER QL IB: PRESENT
B BURGDOR41KD IGM SER QL IB: PRESENT
B BURGDOR45KD IGG SER QL IB: NORMAL
B BURGDOR58KD IGG SER QL IB: NORMAL
B BURGDOR66KD IGG SER QL IB: NORMAL
B BURGDOR93KD IGG SER QL IB: PRESENT
BASOPHILS # BLD AUTO: 0.05 K/UL
BASOPHILS NFR BLD AUTO: 0.7 %
BILIRUB SERPL-MCNC: 0.2 MG/DL
BUN SERPL-MCNC: 13 MG/DL
CALCIUM SERPL-MCNC: 9.9 MG/DL
CHLORIDE SERPL-SCNC: 102 MMOL/L
CMV IGG SERPL QL: <0.2 U/ML
CMV IGG SERPL-IMP: NEGATIVE
CO2 SERPL-SCNC: 21 MMOL/L
CREAT SERPL-MCNC: 0.93 MG/DL
DEPRECATED KAPPA LC FREE/LAMBDA SER: 1.53 RATIO
EBV EA AB SER IA-ACNC: 25.2 U/ML
EBV EA AB TITR SER IF: POSITIVE
EBV EA IGG SER QL IA: 122 U/ML
EBV EA IGG SER-ACNC: POSITIVE
EBV EA IGM SER IA-ACNC: NEGATIVE
EBV PATRN SPEC IB-IMP: NORMAL
EBV VCA IGG SER IA-ACNC: 354 U/ML
EBV VCA IGM SER QL IA: 20.6 U/ML
EOSINOPHIL # BLD AUTO: 0.12 K/UL
EOSINOPHIL NFR BLD AUTO: 1.6 %
EPSTEIN-BARR VIRUS CAPSID ANTIGEN IGG: POSITIVE
ERYTHROCYTE [SEDIMENTATION RATE] IN BLOOD BY WESTERGREN METHOD: 6 MM/HR
GLUCOSE SERPL-MCNC: 80 MG/DL
HCT VFR BLD CALC: 43.2 %
HGB BLD-MCNC: 13.9 G/DL
IGA SER QL IEP: 157 MG/DL
IGG SER QL IEP: 1123 MG/DL
IGM SER QL IEP: 111 MG/DL
IMM GRANULOCYTES NFR BLD AUTO: 0.1 %
KAPPA LC CSF-MCNC: 0.94 MG/DL
KAPPA LC SERPL-MCNC: 1.44 MG/DL
LYMPHOCYTES # BLD AUTO: 2.47 K/UL
LYMPHOCYTES NFR BLD AUTO: 33.5 %
MAN DIFF?: NORMAL
MCHC RBC-ENTMCNC: 30.6 PG
MCHC RBC-ENTMCNC: 32.2 GM/DL
MCV RBC AUTO: 95.2 FL
MONOCYTES # BLD AUTO: 0.49 K/UL
MONOCYTES NFR BLD AUTO: 6.6 %
NEUTROPHILS # BLD AUTO: 4.24 K/UL
NEUTROPHILS NFR BLD AUTO: 57.5 %
PLATELET # BLD AUTO: 219 K/UL
POTASSIUM SERPL-SCNC: 4.2 MMOL/L
PROT SERPL-MCNC: 7.3 G/DL
RBC # BLD: 4.54 M/UL
RBC # FLD: 12.2 %
SODIUM SERPL-SCNC: 140 MMOL/L
WBC # FLD AUTO: 7.38 K/UL

## 2021-01-20 NOTE — ED CDU PROVIDER INITIAL DAY NOTE - QUALITY
01/20/21 1417 Amy Paredes
IV DISCONTINUE IN RAC WITHOUT ANY REDNESS OR SWELLING. WRAPPED WITH
2X2 GAUZE & COBAN. aching

## 2021-02-02 ENCOUNTER — APPOINTMENT (OUTPATIENT)
Dept: OTOLARYNGOLOGY | Facility: CLINIC | Age: 26
End: 2021-02-02
Payer: COMMERCIAL

## 2021-02-02 VITALS
BODY MASS INDEX: 18.52 KG/M2 | HEIGHT: 67 IN | SYSTOLIC BLOOD PRESSURE: 110 MMHG | WEIGHT: 118 LBS | HEART RATE: 78 BPM | DIASTOLIC BLOOD PRESSURE: 76 MMHG | TEMPERATURE: 97 F

## 2021-02-02 DIAGNOSIS — R59.0 LOCALIZED ENLARGED LYMPH NODES: ICD-10-CM

## 2021-02-02 DIAGNOSIS — J35.1 HYPERTROPHY OF TONSILS: ICD-10-CM

## 2021-02-02 DIAGNOSIS — J35.01 CHRONIC TONSILLITIS: ICD-10-CM

## 2021-02-02 PROCEDURE — 99072 ADDL SUPL MATRL&STAF TM PHE: CPT

## 2021-02-02 PROCEDURE — 99214 OFFICE O/P EST MOD 30 MIN: CPT

## 2021-02-02 RX ORDER — PREDNISONE 10 MG/1
10 TABLET ORAL TWICE DAILY
Qty: 18 | Refills: 0 | Status: COMPLETED | COMMUNITY
Start: 2021-01-12 | End: 2021-02-02

## 2021-02-02 RX ORDER — CONJUGATED ESTROGENS 0.62 MG/G
0.62 CREAM VAGINAL
Qty: 30 | Refills: 0 | Status: COMPLETED | COMMUNITY
Start: 2020-11-30 | End: 2021-02-02

## 2021-02-02 RX ORDER — AMOXICILLIN AND CLAVULANATE POTASSIUM 875; 125 MG/1; MG/1
875-125 TABLET, COATED ORAL
Qty: 20 | Refills: 0 | Status: COMPLETED | COMMUNITY
Start: 2021-01-05 | End: 2021-02-02

## 2021-02-02 RX ORDER — BACILLUS COAGULANS/INULIN 1B-250 MG
CAPSULE ORAL
Refills: 0 | Status: ACTIVE | COMMUNITY

## 2021-02-02 NOTE — ASSESSMENT
[FreeTextEntry1] : Reviewed and reconciled medications, allergies, PMHx, PSHx, SocHx, FMHx.\par \par h/o mono, tonsillitis, throat pain, otalgia, pain with swallowing\par tonsils class 4 and cryptic\par cervical lymphadenopathy\par \par lab work 1/18/21: CBC with diff normal, SED rate nl, metabolic panel nl, cytomegalovirus antibody nl, EPV positive, immunoglobulins panel nl, lyme negative, LEESA negative\par \par Plan;\par Reviewed LEESA, CBC, CMP, CMV IgG antibody, EBV, lyme western blot panel, SED rate. Discussed r/b/a of tonsillectomy.

## 2021-02-02 NOTE — ADDENDUM
[FreeTextEntry1] : Documented by Callie Silva acting as scribe for Dr. Jonas on 02/02/2021.\par \par All Medical record entries made by the Scribe were at my, Dr. Jonas, direction and personally dictated by me on 02/02/2021 . I have reviewed the chart and agree that the record accurately reflects my personal performance of the history, physical exam, assessment and plan. I have also personally directed, reviewed, and agreed with the discharge instructions.

## 2021-02-02 NOTE — CONSULT LETTER
[Dear  ___] : Dear  [unfilled], [Courtesy Letter:] : I had the pleasure of seeing your patient, [unfilled], in my office today. [Please see my note below.] : Please see my note below. [Consult Closing:] : Thank you very much for allowing me to participate in the care of this patient.  If you have any questions, please do not hesitate to contact me. [Sincerely,] : Sincerely, [FreeTextEntry3] : Ulises Jonas MD FACS

## 2021-02-02 NOTE — REVIEW OF SYSTEMS
[Ear Pain] : ear pain [Throat Pain] : throat pain [de-identified] : tonsil hypertrophy, painful swallowing

## 2021-02-02 NOTE — PHYSICAL EXAM
[Normal] : mucosa is normal [Midline] : trachea located in midline position [de-identified] : level 2 cervical lymphadenopathy, right level 3 and level 4 lymphadenopathy [de-identified] : class 4 and cryptic

## 2021-02-02 NOTE — HISTORY OF PRESENT ILLNESS
[de-identified] : The patient presents with h/o tonsillitis, throat pain, otalgia, pain with swallowing. Pt states that she took the abx and as soon as she finished them the tonsils swelled back up. She saw infectious disease in the hospital when she had mono because she became anemic.

## 2021-02-12 ENCOUNTER — LABORATORY RESULT (OUTPATIENT)
Age: 26
End: 2021-02-12

## 2021-02-23 ENCOUNTER — RESULT REVIEW (OUTPATIENT)
Age: 26
End: 2021-02-23

## 2021-02-23 ENCOUNTER — APPOINTMENT (OUTPATIENT)
Dept: OTOLARYNGOLOGY | Facility: AMBULATORY MEDICAL SERVICES | Age: 26
End: 2021-02-23
Payer: COMMERCIAL

## 2021-02-23 PROCEDURE — 42826 REMOVAL OF TONSILS: CPT

## 2021-02-23 RX ORDER — HYDROCODONE BITARTRATE AND ACETAMINOPHEN 7.5; 325 MG/15ML; MG/15ML
7.5-325 SOLUTION ORAL EVERY 6 HOURS
Qty: 420 | Refills: 0 | Status: ACTIVE | COMMUNITY
Start: 2021-02-23 | End: 1900-01-01

## 2021-02-23 RX ORDER — AMOXICILLIN 250 MG/5ML
250 POWDER, FOR SUSPENSION ORAL 3 TIMES DAILY
Qty: 300 | Refills: 0 | Status: ACTIVE | COMMUNITY
Start: 2021-02-23 | End: 1900-01-01

## 2021-03-01 RX ORDER — HYDROCODONE BITARTRATE AND ACETAMINOPHEN 7.5; 325 MG/15ML; MG/15ML
7.5-325 SOLUTION ORAL EVERY 6 HOURS
Qty: 300 | Refills: 0 | Status: ACTIVE | COMMUNITY
Start: 2021-03-01 | End: 1900-01-01

## 2021-03-05 ENCOUNTER — APPOINTMENT (OUTPATIENT)
Dept: OTOLARYNGOLOGY | Facility: CLINIC | Age: 26
End: 2021-03-05
Payer: COMMERCIAL

## 2021-03-05 VITALS
TEMPERATURE: 97 F | BODY MASS INDEX: 17.74 KG/M2 | SYSTOLIC BLOOD PRESSURE: 111 MMHG | HEART RATE: 67 BPM | HEIGHT: 67 IN | DIASTOLIC BLOOD PRESSURE: 68 MMHG | WEIGHT: 113 LBS

## 2021-03-05 DIAGNOSIS — Z98.890 OTHER SPECIFIED POSTPROCEDURAL STATES: ICD-10-CM

## 2021-03-05 DIAGNOSIS — G89.18 OTHER ACUTE POSTPROCEDURAL PAIN: ICD-10-CM

## 2021-03-05 PROCEDURE — 99024 POSTOP FOLLOW-UP VISIT: CPT

## 2021-03-05 NOTE — ADDENDUM
[FreeTextEntry1] : Documented by Callie Silva acting as scribe for Dr. Jonas on 03/05/2021.\par \par All Medical record entries made by the Scribe were at my, Dr. Jonas, direction and personally dictated by me on 03/05/2021 . I have reviewed the chart and agree that the record accurately reflects my personal performance of the history, physical exam, assessment and plan. I have also personally directed, reviewed, and agreed with the discharge instructions.

## 2021-03-05 NOTE — ASSESSMENT
[FreeTextEntry1] : Reviewed and reconciled medications, allergies, PMHx, PSHx, SocHx, FMHx.\par \par The patient presents s/p tonsillectomy on 2/23/21. Pt reports doing well. Her ears and throat have been painful. She is still taking the pain medication. She is drinking plenty of fluids. He urine is pale yellow.\par \par On Exam:\par ears: normal\par oral: uvula edema with ulceration at the tip, tonsils healing well\par \par pathology report 2/23/21: chronic tonsillitis\par \par Plan:\par Reviewed pathology report. Increase water intake. Drink soda. FU prn.

## 2022-05-12 NOTE — ED CDU PROVIDER INITIAL DAY NOTE - CPE EDP SKIN NORM
Presents for staple removal.  Staple x 1 placed to posterior scalp 11 days ago. Wound is well approximated.
normal...

## 2022-08-16 NOTE — ED PROVIDER NOTE - RELIEVING FACTORS
none Eucrisa Counseling: Patient may experience a mild burning sensation during topical application. Eucrisa is not approved in children less than 2 years of age.

## 2023-02-08 ENCOUNTER — APPOINTMENT (OUTPATIENT)
Dept: OTOLARYNGOLOGY | Facility: CLINIC | Age: 28
End: 2023-02-08
Payer: COMMERCIAL

## 2023-02-08 VITALS
WEIGHT: 118 LBS | BODY MASS INDEX: 18.52 KG/M2 | DIASTOLIC BLOOD PRESSURE: 71 MMHG | HEIGHT: 67 IN | SYSTOLIC BLOOD PRESSURE: 107 MMHG | HEART RATE: 68 BPM

## 2023-02-08 DIAGNOSIS — H69.83 OTHER SPECIFIED DISORDERS OF EUSTACHIAN TUBE, BILATERAL: ICD-10-CM

## 2023-02-08 DIAGNOSIS — J31.0 CHRONIC RHINITIS: ICD-10-CM

## 2023-02-08 PROCEDURE — 99213 OFFICE O/P EST LOW 20 MIN: CPT

## 2023-02-08 NOTE — HISTORY OF PRESENT ILLNESS
[de-identified] : Maia Friedman is a 28 yo female with hx tonsillectomy who presents for evaluation of sore throat. She states that on 1/27, she had a fever with ear pain and throat pain. She was seen by her PCP. Strep and Covid were negative. She was put on amoxicillin for possible ear infection. She notes mild improvement in throat symptoms over the past day. She notes ear pain when she swallows. She notes mild dysphagia to solid foods requiring a double swallow. She notes odynophagia. She denies dyspnea and feels that her voice is slightly more nasal. She has been having some bloody nasal discharge when blowing her nose. She denies nasal congestion or rhinorrhea, but notes postnasal drainge which she believes is leading to her sore throat. She denies otorrhea, tinnitus, or hearing loss. She notes occasional off balance sensation that has worsened with illness. She denies fevers or chills in the past week.

## 2023-02-08 NOTE — ASSESSMENT
[FreeTextEntry1] : Maia Friedman presents for evaluation of several weeks of postnasal drainage, dysphagia, sore throat, and ear irritation. She has been through a round of antibiotics and has had mild improvement in symptoms. She has active postnasal drainage on oropharyngeal exam. Otoscopic exam was normal. Discussed flexible laryngoscopy but she notes syncopal episodes with this. Will hold off for now and treat her rhinitis, eustachian tube dysfunction, and postnasal drainage with nasal steroid spray, then reevaluate.\par \par - Nasal saline sprays TID.\par - Flonase 2 sprays to each nostril BID x 3 weeks.\par - Follow up in 3 weeks.

## 2023-03-01 ENCOUNTER — APPOINTMENT (OUTPATIENT)
Dept: OTOLARYNGOLOGY | Facility: CLINIC | Age: 28
End: 2023-03-01

## 2024-07-25 ENCOUNTER — NON-APPOINTMENT (OUTPATIENT)
Age: 29
End: 2024-07-25

## 2024-07-26 ENCOUNTER — APPOINTMENT (OUTPATIENT)
Dept: OTOLARYNGOLOGY | Facility: CLINIC | Age: 29
End: 2024-07-26
Payer: COMMERCIAL

## 2024-07-26 VITALS
DIASTOLIC BLOOD PRESSURE: 71 MMHG | HEART RATE: 61 BPM | HEIGHT: 67 IN | BODY MASS INDEX: 19.62 KG/M2 | SYSTOLIC BLOOD PRESSURE: 114 MMHG | WEIGHT: 125 LBS

## 2024-07-26 DIAGNOSIS — J02.9 ACUTE PHARYNGITIS, UNSPECIFIED: ICD-10-CM

## 2024-07-26 PROCEDURE — 99213 OFFICE O/P EST LOW 20 MIN: CPT

## 2024-07-26 RX ORDER — AMOXICILLIN AND CLAVULANATE POTASSIUM 875; 125 MG/1; MG/1
875-125 TABLET, COATED ORAL
Qty: 20 | Refills: 0 | Status: ACTIVE | COMMUNITY
Start: 2024-07-26 | End: 1900-01-01

## 2024-07-26 NOTE — PHYSICAL EXAM
[Midline] : trachea located in midline position [Removed] : palatine tonsils previously removed [de-identified] : Erythema of oropharyngeal mucosa. [Normal] : no rashes

## 2024-07-26 NOTE — HISTORY OF PRESENT ILLNESS
[de-identified] : Maia Friedman is a 26 yo female with hx tonsillectomy who presents for evaluation of sore throat. She states that on 1/27, she had a fever with ear pain and throat pain. She was seen by her PCP. Strep and Covid were negative. She was put on amoxicillin for possible ear infection. She notes mild improvement in throat symptoms over the past day. She notes ear pain when she swallows. She notes mild dysphagia to solid foods requiring a double swallow. She notes odynophagia. She denies dyspnea and feels that her voice is slightly more nasal. She has been having some bloody nasal discharge when blowing her nose. She denies nasal congestion or rhinorrhea, but notes postnasal drainge which she believes is leading to her sore throat. She denies otorrhea, tinnitus, or hearing loss. She notes occasional off balance sensation that has worsened with illness. She denies fevers or chills in the past week. [FreeTextEntry1] : 7/26/24 - Maia Vilchis presents for follow-up. She notes that she became febrile on 7/10/24. The next day, she developed mild sore throat.  This worsened and she went to urgent care on 7/16/24. She was started on azithromycin and ofloxacin eye drops for possible conjunctivitis. She went back on 7/19/24 with worsened throat pain. She was then put on medrol dose pack.  After completing this, her symptoms returned. She notes continued sore throat and odynophagia. She denies dysphagia, dyspnea, dysphonia. She denies nasal congestion, rhinorrhea, postnasal drainage, or sinus pressure. She is using flonase at night.

## 2024-07-26 NOTE — ASSESSMENT
[FreeTextEntry1] : Maia Friedman presents for follow-up. She has sore throat and odynophagia, previously treated with azithromycin and medrol dose pack by urgent care. On exam, she has evidence of acute pharyngitis. Will start augmentin.   - start augmentin x 10 days. Side effects were discussed and include but are not limited to nausea, vomiting, diarrhea, and skin rash. - f/u if symptoms persist or worsen.

## 2024-09-04 NOTE — PATIENT PROFILE ADULT - NSPROMUTANXFEARADDRESSFT_GEN_A_NUR
n/a
